# Patient Record
Sex: MALE | Race: WHITE | NOT HISPANIC OR LATINO | Employment: UNEMPLOYED | URBAN - NONMETROPOLITAN AREA
[De-identification: names, ages, dates, MRNs, and addresses within clinical notes are randomized per-mention and may not be internally consistent; named-entity substitution may affect disease eponyms.]

---

## 2022-11-13 ENCOUNTER — HOSPITAL ENCOUNTER (EMERGENCY)
Facility: HOSPITAL | Age: 32
Discharge: HOME | End: 2022-11-13
Attending: EMERGENCY MEDICINE

## 2022-11-13 VITALS
TEMPERATURE: 97.5 F | SYSTOLIC BLOOD PRESSURE: 148 MMHG | WEIGHT: 145 LBS | OXYGEN SATURATION: 100 % | HEART RATE: 117 BPM | DIASTOLIC BLOOD PRESSURE: 88 MMHG | BODY MASS INDEX: 21.98 KG/M2 | HEIGHT: 68 IN | RESPIRATION RATE: 17 BRPM

## 2022-11-13 DIAGNOSIS — T40.601A OPIATE OVERDOSE (HCC): Primary | ICD-10-CM

## 2022-11-13 DIAGNOSIS — F19.10 SUBSTANCE ABUSE (HCC): ICD-10-CM

## 2022-11-13 LAB
AMPHETAMINES SERPL QL SCN: POSITIVE
BARBITURATES UR QL: NEGATIVE
BENZODIAZ UR QL: NEGATIVE
COCAINE UR QL: NEGATIVE
METHADONE UR QL: NEGATIVE
OPIATES UR QL SCN: NEGATIVE
OXYCODONE+OXYMORPHONE UR QL SCN: NEGATIVE
PCP UR QL: NEGATIVE
THC UR QL: NEGATIVE

## 2022-11-13 RX ADMIN — NALOXONE HYDROCHLORIDE 4 MG: 4 SPRAY NASAL at 03:14

## 2022-11-13 NOTE — ED PROVIDER NOTES
History  Chief Complaint   Patient presents with   • Medical Problem     Pt thought he was taking Meth around 2030 but now thinks he accidentally took Fentanyl  Pt stated he received Narcan and CPR at home by bystanders  Pt would like drug testing  29-year-old male with sister requests urine drug testing for evaluation drug ingested earlier  Believes he was snorting methamphetamine approximately 10 30 last evening, became unresponsive, father administered Narcan, sister briefly did CPR/chest compressions  When EMS arrived, was awake, refused further treatment  Notes he is currently comfortable, no chest discomfort or breathing issues  Describes long history of psych and substance use  Notes prior was at [facility] for drug treatment who provided Narcan  History provided by:  Patient and relative  Medical Problem  Quality:  Overdose  Severity:  Severe  Onset quality:  Sudden  Progression:  Resolved  Chronicity:  Recurrent  Context:  Snorted substance believed to be methamphetamine  Relieved by:  Narcan  Associated symptoms: no abdominal pain, no chest pain and no shortness of breath        None       Past Medical History:   Diagnosis Date   • Drug abuse ([facility] )    • Substance abuse ([facility] )        History reviewed  No pertinent surgical history  History reviewed  No pertinent family history  I have reviewed and agree with the history as documented  E-Cigarette/Vaping     E-Cigarette/Vaping Substances     Social History     Tobacco Use   • Smoking status: Current Every Day Smoker     Packs/day: 1 50     Types: Cigarettes   • Smokeless tobacco: Never Used   Substance Use Topics   • Alcohol use: Never   • Drug use: Yes     Types: Methamphetamines       Review of Systems   Respiratory: Negative for shortness of breath  Cardiovascular: Negative for chest pain  Gastrointestinal: Negative for abdominal pain  All other systems reviewed and are negative        Physical Exam  Physical Exam  Vitals and nursing note reviewed  Constitutional:       Comments: Pleasant, comfortable-appearing, no distress, moves easily, appears to have some shoulder/neck and facial tics/spasticity worse talking   HENT:      Head: Normocephalic and atraumatic  Mouth/Throat:      Mouth: Mucous membranes are moist       Pharynx: Oropharynx is clear  Eyes:      Conjunctiva/sclera: Conjunctivae normal       Pupils: Pupils are equal, round, and reactive to light  Cardiovascular:      Rate and Rhythm: Normal rate and regular rhythm  Heart sounds: Normal heart sounds  Comments: No chest tenderness deformity  Pulmonary:      Effort: Pulmonary effort is normal       Breath sounds: Normal breath sounds  Abdominal:      General: Bowel sounds are normal  There is no distension  Palpations: Abdomen is soft  Tenderness: There is no abdominal tenderness  Musculoskeletal:         General: No deformity  Cervical back: Neck supple  Skin:     General: Skin is warm and dry  Neurological:      General: No focal deficit present  Mental Status: He is alert and oriented to person, place, and time  Cranial Nerves: No cranial nerve deficit  Coordination: Coordination normal    Psychiatric:         Judgment: Judgment normal          Vital Signs  ED Triage Vitals [11/13/22 0258]   Temperature Pulse Respirations Blood Pressure SpO2   97 5 °F (36 4 °C) (!) 117 17 148/88 100 %      Temp Source Heart Rate Source Patient Position - Orthostatic VS BP Location FiO2 (%)   Temporal Monitor Sitting Right arm --      Pain Score       --           Vitals:    11/13/22 0258   BP: 148/88   Pulse: (!) 117   Patient Position - Orthostatic VS: Sitting         Visual Acuity      ED Medications  Medications   naloxone nasal- Given to patient by provider at discharge   (NARCAN) 4 mg/0 1 mL nasal spray 4 mg (4 mg Does not apply Given by Other 11/13/22 0314)       Diagnostic Studies  Results Reviewed     Procedure Component Value Units Date/Time    Rapid drug screen, urine [[id number]] Collected: 11/13/22 0314    Lab Status: In process Specimen: Urine, Clean Catch Updated: 11/13/22 0319                 No orders to display              Procedures  Procedures         ED Course  ED Course as of 11/13/22 0332   Sun Nov 13, 2022   0326 Remains comfortable, we discussed high likelihood of overdose related death in the near future without cessation, we provided additional Narcan and discussed clinical outcomes intervention, voices good understanding, will access my chart for results                               SBIRT 22yo+    Flowsheet Row Most Recent Value   SBIRT (23 yo +)    In order to provide better care to our patients, we are screening all of our patients for alcohol and drug use  Would it be okay to ask you these screening questions? No Filed at: 11/13/2022 0300                    MDM    Disposition  Final diagnoses:   Opiate overdose ([facility] )   Substance abuse ([facility] )     Time reflects when diagnosis was documented in both MDM as applicable and the Disposition within this note     Time User Action Codes Description Comment    11/13/2022  3:04 AM Paralee Gil Add [T40 601A] Opiate overdose ([facility] )     11/13/2022  3:04 AM Paralee Gil Add [F19 10] Substance abuse [facility])       ED Disposition     ED Disposition   Discharge    Condition   Stable    Date/Time   Sun Nov 13, 2022  3:04 AM    Comment   Ángel Keenan  discharge to home/self care                 Follow-up Information     Follow up With Specialties Details Why Contact Info Additional [id number] [facility] Family Medicine Schedule an appointment as soon as possible for a visit in 1 week  [phone] [facility], [address] [place] [address] Family Medicine Schedule an appointment as soon as possible for a visit in 1 week [facility]  [phone]             There are no discharge medications for this patient  No discharge procedures on file      PDMP Review     None          ED Provider  Electronically Signed by           Fernie Jenkins DO  11/13/22 0734

## 2023-10-05 ENCOUNTER — OFFICE VISIT (OUTPATIENT)
Dept: URGENT CARE | Facility: CLINIC | Age: 33
End: 2023-10-05
Payer: COMMERCIAL

## 2023-10-05 ENCOUNTER — APPOINTMENT (OUTPATIENT)
Dept: RADIOLOGY | Facility: CLINIC | Age: 33
End: 2023-10-05
Payer: COMMERCIAL

## 2023-10-05 VITALS
TEMPERATURE: 99.6 F | SYSTOLIC BLOOD PRESSURE: 129 MMHG | HEART RATE: 98 BPM | DIASTOLIC BLOOD PRESSURE: 77 MMHG | RESPIRATION RATE: 18 BRPM | OXYGEN SATURATION: 98 %

## 2023-10-05 DIAGNOSIS — L03.116 CELLULITIS OF LEFT ANKLE: ICD-10-CM

## 2023-10-05 DIAGNOSIS — M25.572 ACUTE LEFT ANKLE PAIN: ICD-10-CM

## 2023-10-05 DIAGNOSIS — S90.122A CONTUSION OF FIFTH TOE OF LEFT FOOT, INITIAL ENCOUNTER: ICD-10-CM

## 2023-10-05 DIAGNOSIS — S93.402A SPRAIN OF LEFT ANKLE, UNSPECIFIED LIGAMENT, INITIAL ENCOUNTER: Primary | ICD-10-CM

## 2023-10-05 PROBLEM — F31.9 BIPOLAR DISORDER (HCC): Status: ACTIVE | Noted: 2022-07-03

## 2023-10-05 PROBLEM — Z72.0 TOBACCO USE: Status: ACTIVE | Noted: 2022-07-01

## 2023-10-05 PROCEDURE — 73610 X-RAY EXAM OF ANKLE: CPT

## 2023-10-05 PROCEDURE — 99203 OFFICE O/P NEW LOW 30 MIN: CPT

## 2023-10-05 PROCEDURE — 99203 OFFICE O/P NEW LOW 30 MIN: CPT | Performed by: PHYSICIAN ASSISTANT

## 2023-10-05 PROCEDURE — 73630 X-RAY EXAM OF FOOT: CPT

## 2023-10-05 RX ORDER — DICLOFENAC SODIUM 75 MG/1
75 TABLET, DELAYED RELEASE ORAL 2 TIMES DAILY
Qty: 10 TABLET | Refills: 0 | Status: SHIPPED | OUTPATIENT
Start: 2023-10-05 | End: 2023-10-10

## 2023-10-05 RX ORDER — CEPHALEXIN 500 MG/1
500 CAPSULE ORAL EVERY 6 HOURS SCHEDULED
Qty: 28 CAPSULE | Refills: 0 | Status: SHIPPED | OUTPATIENT
Start: 2023-10-05 | End: 2023-10-12

## 2023-10-05 NOTE — PATIENT INSTRUCTIONS
Ankle Sprain   WHAT YOU NEED TO KNOW:   An ankle sprain happens when 1 or more ligaments in your ankle joint stretch or tear. Ligaments are tough tissues that connect bones. Ligaments support your joints and keep your bones in place. DISCHARGE INSTRUCTIONS:   Return to the emergency department if:   You have severe pain in your ankle. Your foot or toes are cold or numb. Your ankle becomes more weak or unstable (wobbly). You are unable to put any weight on your ankle or foot. Your swelling has increased or returned. Call your doctor if:   Your pain does not go away, even after treatment. You have questions or concerns about your condition or care. Medicines: You may need any of the following:  NSAIDs , such as ibuprofen, help decrease swelling, pain, and fever. This medicine is available with or without a doctor's order. NSAIDs can cause stomach bleeding or kidney problems in certain people. If you take blood thinner medicine, always ask your healthcare provider if NSAIDs are safe for you. Always read the medicine label and follow directions. Acetaminophen  decreases pain and fever. It is available without a doctor's order. Ask how much to take and how often to take it. Follow directions. Read the labels of all other medicines you are using to see if they also contain acetaminophen, or ask your doctor or pharmacist. Acetaminophen can cause liver damage if not taken correctly. Prescription pain medicine  may be given. Ask your healthcare provider how to take this medicine safely. Some prescription pain medicines contain acetaminophen. Do not take other medicines that contain acetaminophen without talking to your healthcare provider. Too much acetaminophen may cause liver damage. Prescription pain medicine may cause constipation. Ask your healthcare provider how to prevent or treat constipation. Take your medicine as directed.   Contact your healthcare provider if you think your medicine is not helping or if you have side effects. Tell your provider if you are allergic to any medicine. Keep a list of the medicines, vitamins, and herbs you take. Include the amounts, and when and why you take them. Bring the list or the pill bottles to follow-up visits. Carry your medicine list with you in case of an emergency. Self-care:   Use support devices , such as a brace, cast, or splint, to limit your movement and protect your joint. You may need to use crutches to decrease your pain as you move around. Go to physical therapy  as directed. A physical therapist teaches you exercises to help improve movement and strength, and to decrease pain. Rest  your ankle so that it can heal. Return to normal activities as directed. Apply ice  on your ankle for 15 to 20 minutes every hour or as directed. Use an ice pack, or put crushed ice in a plastic bag. Cover the ice pack or bag with a towel before you put it on your injury. Ice helps prevent tissue damage and decreases swelling and pain. Compress  your ankle. Ask if you should wrap an elastic bandage around your injured ligament. An elastic bandage provides support and helps decrease swelling and movement so your joint can heal. Wear as long as directed. Elevate  your ankle above the level of your heart as often as you can. This will help decrease swelling and pain. Prop your ankle on pillows or blankets to keep it elevated comfortably. Prevent another ankle sprain:   Let your ankle heal.  Find out how long your ligament needs to heal. Do not do any physical activity until your healthcare provider says it is okay. If you start activity too soon, you may develop a more serious injury. Warm up and stretch before you exercise or play sports. This helps your joints become strong and flexible. Use the right equipment. Always wear shoes that fit well and are made for the activity that you are doing.  You may also need ankle supports, elbow and knee pads, or braces. Follow up with your doctor as directed:  Write down your questions so you remember to ask them during your visits. © Copyright Chris Rios 2023 Information is for End User's use only and may not be sold, redistributed or otherwise used for commercial purposes. The above information is an  only. It is not intended as medical advice for individual conditions or treatments. Talk to your doctor, nurse or pharmacist before following any medical regimen to see if it is safe and effective for you. Anthony Redhead 0  .0

## 2023-10-05 NOTE — PROGRESS NOTES
[facility]        NAME: Williams Julian. is a 34 y.o. male  : 1990    MRN: [id number]  DATE: 2023  TIME: 2:12 PM    Assessment and Plan   Sprain of left ankle, unspecified ligament, initial encounter [S93.402A]  1. Sprain of left ankle, unspecified ligament, initial encounter        2. Acute left ankle pain  XR ankle 3+ vw left    diclofenac (VOLTAREN) 75 mg EC tablet      3. Contusion of fifth toe of left foot, initial encounter  XR foot 3+ vw left      4. Cellulitis of left ankle  cephalexin (KEFLEX) 500 mg capsule            Patient Instructions   Patient Instructions     Ankle Sprain   WHAT YOU NEED TO KNOW:   An ankle sprain happens when 1 or more ligaments in your ankle joint stretch or tear. Ligaments are tough tissues that connect bones. Ligaments support your joints and keep your bones in place. DISCHARGE INSTRUCTIONS:   Return to the emergency department if:   • You have severe pain in your ankle. • Your foot or toes are cold or numb. • Your ankle becomes more weak or unstable (wobbly). • You are unable to put any weight on your ankle or foot. • Your swelling has increased or returned. Call your doctor if:   • Your pain does not go away, even after treatment. • You have questions or concerns about your condition or care. Medicines: You may need any of the following:  • NSAIDs , such as ibuprofen, help decrease swelling, pain, and fever. This medicine is available with or without a doctor's order. NSAIDs can cause stomach bleeding or kidney problems in certain people. If you take blood thinner medicine, always ask your healthcare provider if NSAIDs are safe for you. Always read the medicine label and follow directions. • Acetaminophen  decreases pain and fever. It is available without a doctor's order. Ask how much to take and how often to take it. Follow directions.  Read the labels of all other medicines you are using to see if they also contain acetaminophen, or ask your doctor or pharmacist. Acetaminophen can cause liver damage if not taken correctly. • Prescription pain medicine  may be given. Ask your healthcare provider how to take this medicine safely. Some prescription pain medicines contain acetaminophen. Do not take other medicines that contain acetaminophen without talking to your healthcare provider. Too much acetaminophen may cause liver damage. Prescription pain medicine may cause constipation. Ask your healthcare provider how to prevent or treat constipation. • Take your medicine as directed. Contact your healthcare provider if you think your medicine is not helping or if you have side effects. Tell your provider if you are allergic to any medicine. Keep a list of the medicines, vitamins, and herbs you take. Include the amounts, and when and why you take them. Bring the list or the pill bottles to follow-up visits. Carry your medicine list with you in case of an emergency. Self-care:   • Use support devices , such as a brace, cast, or splint, to limit your movement and protect your joint. You may need to use crutches to decrease your pain as you move around. • Go to physical therapy  as directed. A physical therapist teaches you exercises to help improve movement and strength, and to decrease pain. • Rest  your ankle so that it can heal. Return to normal activities as directed. • Apply ice  on your ankle for 15 to 20 minutes every hour or as directed. Use an ice pack, or put crushed ice in a plastic bag. Cover the ice pack or bag with a towel before you put it on your injury. Ice helps prevent tissue damage and decreases swelling and pain. • Compress  your ankle. Ask if you should wrap an elastic bandage around your injured ligament. An elastic bandage provides support and helps decrease swelling and movement so your joint can heal. Wear as long as directed.          • Elevate  your ankle above the level of your heart as often as you can. This will help decrease swelling and pain. Prop your ankle on pillows or blankets to keep it elevated comfortably. Prevent another ankle sprain:   • Let your ankle heal.  Find out how long your ligament needs to heal. Do not do any physical activity until your healthcare provider says it is okay. If you start activity too soon, you may develop a more serious injury. • Warm up and stretch before you exercise or play sports. This helps your joints become strong and flexible. • Use the right equipment. Always wear shoes that fit well and are made for the activity that you are doing. You may also need ankle supports, elbow and knee pads, or braces. Follow up with your doctor as directed:  Write down your questions so you remember to ask them during your visits. © Copyright [facility] 2023 Information is for End User's use only and may not be sold, redistributed or otherwise used for commercial purposes. The above information is an  only. It is not intended as medical advice for individual conditions or treatments. Talk to your doctor, nurse or pharmacist before following any medical regimen to see if it is safe and effective for you. Keyon Rojo 0  .0        Follow up with PCP in 3-5 days. Proceed to  ER if symptoms worsen. Chief Complaint     Chief Complaint   Patient presents with   • Ankle Injury     Left            History of Present Illness       Patient is a 57-year-old male with past medical history significant for substance abuse disorder who presents to the clinic for an injury to his left lower extremity. He states that he was using a  2 days ago when he slipped off a curb and turned his foot inward. He is complaining of pain and swelling in the left lateral foot. He states that he is having trouble ambulating on his foot. He complains of 10 out of 10 pain. He currently denies any drug use.       Review of Systems   Review of Systems Musculoskeletal: Positive for arthralgias, joint swelling and myalgias. Negative for neck pain and neck stiffness. Skin: Positive for color change and wound. Current Medications       Current Outpatient Medications:   •  cephalexin (KEFLEX) 500 mg capsule, Take 1 capsule (500 mg total) by mouth every 6 (six) hours for 7 days, Disp: 28 capsule, Rfl: 0  •  diclofenac (VOLTAREN) 75 mg EC tablet, Take 1 tablet (75 mg total) by mouth 2 (two) times a day for 5 days, Disp: 10 tablet, Rfl: 0    Current Allergies     Allergies as of 10/05/2023   • (No Known Allergies)            The following portions of the patient's history were reviewed and updated as appropriate: allergies, current medications, past family history, past medical history, past social history, past surgical history and problem list.     Past Medical History:   Diagnosis Date   • Drug abuse ([address])    • Substance abuse ([address])        No past surgical history on file. No family history on file. Medications have been verified. Objective   /77   Pulse 98   Temp 99.6 °F (37.6 °C)   Resp 18   SpO2 98%        Physical Exam     Physical Exam  Musculoskeletal:        Legs:         Feet:       Comments: There is edema, erythema and warmth noted on the left lateral ankle. There is decreased range of motion to flexion, extension, and rotation of the left ankle. There is edema noted in the left foot. Skin:     Comments: Is a small superficial area on the lateral left ankle that could be the source of a developing cellulitis of the left ankle. This is less than 1 cm in size. I personally interpreted the x-ray results and reviewed them with the patient. There is no acute fracture. -He will be placed in a DME cam boot. I will give an antibiotic for possible secondary infection. I suggest close follow-up with his primary care doctor. Patient was instructed to go to the ER if symptoms worsen.   He may need a venous Doppler if symptoms fail to improve.

## 2023-10-08 ENCOUNTER — HOSPITAL ENCOUNTER (EMERGENCY)
Facility: HOSPITAL | Age: 33
Discharge: HOME | End: 2023-10-08
Attending: EMERGENCY MEDICINE
Payer: COMMERCIAL

## 2023-10-08 VITALS
DIASTOLIC BLOOD PRESSURE: 89 MMHG | WEIGHT: 155 LBS | RESPIRATION RATE: 16 BRPM | HEIGHT: 68 IN | SYSTOLIC BLOOD PRESSURE: 137 MMHG | TEMPERATURE: 98.1 F | HEART RATE: 97 BPM | BODY MASS INDEX: 23.49 KG/M2 | OXYGEN SATURATION: 100 %

## 2023-10-08 DIAGNOSIS — L03.90 CELLULITIS: Primary | ICD-10-CM

## 2023-10-08 LAB
ALBUMIN SERPL BCP-MCNC: 3.5 G/DL (ref 3.5–5)
ALP SERPL-CCNC: 51 U/L (ref 34–104)
ALT SERPL W P-5'-P-CCNC: 30 U/L (ref 7–52)
ANION GAP SERPL CALCULATED.3IONS-SCNC: 7 MMOL/L
APTT PPP: 38 SECONDS (ref 23–37)
AST SERPL W P-5'-P-CCNC: 20 U/L (ref 13–39)
BASOPHILS # BLD AUTO: 0.02 THOUSANDS/ÂΜL (ref 0–0.1)
BASOPHILS NFR BLD AUTO: 0 % (ref 0–1)
BILIRUB SERPL-MCNC: 0.33 MG/DL (ref 0.2–1)
BUN SERPL-MCNC: 12 MG/DL (ref 5–25)
CALCIUM SERPL-MCNC: 8.7 MG/DL (ref 8.4–10.2)
CHLORIDE SERPL-SCNC: 103 MMOL/L (ref 96–108)
CO2 SERPL-SCNC: 28 MMOL/L (ref 21–32)
CREAT SERPL-MCNC: 0.73 MG/DL (ref 0.6–1.3)
EOSINOPHIL # BLD AUTO: 0.08 THOUSAND/ÂΜL (ref 0–0.61)
EOSINOPHIL NFR BLD AUTO: 1 % (ref 0–6)
ERYTHROCYTE [DISTWIDTH] IN BLOOD BY AUTOMATED COUNT: 13 % (ref 11.6–15.1)
GFR SERPL CREATININE-BSD FRML MDRD: 123 ML/MIN/1.73SQ M
GLUCOSE SERPL-MCNC: 113 MG/DL (ref 65–140)
HCT VFR BLD AUTO: 31.4 % (ref 36.5–49.3)
HGB BLD-MCNC: 10.4 G/DL (ref 12–17)
IMM GRANULOCYTES # BLD AUTO: 0.03 THOUSAND/UL (ref 0–0.2)
IMM GRANULOCYTES NFR BLD AUTO: 0 % (ref 0–2)
INR PPP: 1.06 (ref 0.84–1.19)
LACTATE SERPL-SCNC: 1.1 MMOL/L (ref 0.5–2)
LYMPHOCYTES # BLD AUTO: 1.09 THOUSANDS/ÂΜL (ref 0.6–4.47)
LYMPHOCYTES NFR BLD AUTO: 14 % (ref 14–44)
MCH RBC QN AUTO: 29.5 PG (ref 26.8–34.3)
MCHC RBC AUTO-ENTMCNC: 33.1 G/DL (ref 31.4–37.4)
MCV RBC AUTO: 89 FL (ref 82–98)
MONOCYTES # BLD AUTO: 1.32 THOUSAND/ÂΜL (ref 0.17–1.22)
MONOCYTES NFR BLD AUTO: 17 % (ref 4–12)
NEUTROPHILS # BLD AUTO: 5.44 THOUSANDS/ÂΜL (ref 1.85–7.62)
NEUTS SEG NFR BLD AUTO: 68 % (ref 43–75)
NRBC BLD AUTO-RTO: 0 /100 WBCS
PLATELET # BLD AUTO: 181 THOUSANDS/UL (ref 149–390)
PMV BLD AUTO: 11.1 FL (ref 8.9–12.7)
POTASSIUM SERPL-SCNC: 3 MMOL/L (ref 3.5–5.3)
PROCALCITONIN SERPL-MCNC: 0.34 NG/ML
PROT SERPL-MCNC: 6.3 G/DL (ref 6.4–8.4)
PROTHROMBIN TIME: 14.1 SECONDS (ref 11.6–14.5)
RBC # BLD AUTO: 3.52 MILLION/UL (ref 3.88–5.62)
SODIUM SERPL-SCNC: 138 MMOL/L (ref 135–147)
WBC # BLD AUTO: 7.98 THOUSAND/UL (ref 4.31–10.16)

## 2023-10-08 PROCEDURE — 85025 COMPLETE CBC W/AUTO DIFF WBC: CPT | Performed by: EMERGENCY MEDICINE

## 2023-10-08 PROCEDURE — 84145 PROCALCITONIN (PCT): CPT | Performed by: EMERGENCY MEDICINE

## 2023-10-08 PROCEDURE — 99284 EMERGENCY DEPT VISIT MOD MDM: CPT | Performed by: EMERGENCY MEDICINE

## 2023-10-08 PROCEDURE — 80053 COMPREHEN METABOLIC PANEL: CPT | Performed by: EMERGENCY MEDICINE

## 2023-10-08 PROCEDURE — 83605 ASSAY OF LACTIC ACID: CPT | Performed by: EMERGENCY MEDICINE

## 2023-10-08 PROCEDURE — 36415 COLL VENOUS BLD VENIPUNCTURE: CPT | Performed by: EMERGENCY MEDICINE

## 2023-10-08 PROCEDURE — 85610 PROTHROMBIN TIME: CPT | Performed by: EMERGENCY MEDICINE

## 2023-10-08 PROCEDURE — 99283 EMERGENCY DEPT VISIT LOW MDM: CPT

## 2023-10-08 PROCEDURE — 85730 THROMBOPLASTIN TIME PARTIAL: CPT | Performed by: EMERGENCY MEDICINE

## 2023-10-08 RX ORDER — POTASSIUM CHLORIDE 20 MEQ/1
40 TABLET, EXTENDED RELEASE ORAL ONCE
Status: COMPLETED | OUTPATIENT
Start: 2023-10-08 | End: 2023-10-08

## 2023-10-08 RX ADMIN — POTASSIUM CHLORIDE 40 MEQ: 1500 TABLET, EXTENDED RELEASE ORAL at 21:26

## 2023-10-09 NOTE — ED PROVIDER NOTES
History  Chief Complaint   Patient presents with   • Ankle Swelling     Pt reports an increase in left ankle swelling/redness/pain over the last month and a half. Pt is unsure how he originally hurt his ankle but had an xray taken a few days ago which showed a foreign body in the ankle     34year old male with sister concerned about foreign body seen on xray of left ankle for questionable injury this past week describes subacute swelling left leg over the past week, questions injuring while powerwashing. Currently on antibiotics and notes improvement. No fever or chills. No chest pain or dyspnea. No history of VTE. History provided by:  Patient  Medical Problem  Location:  Lower extremity  Quality:  Comparison swelling  Onset quality:  Gradual  Timing:  Constant  Progression:  Worsening  Chronicity:  New  Context:  Atraumatic  Relieved by: Antibiotics  Worsened by:  Nothing  Ineffective treatments:  None  Associated symptoms: no chest pain, no fever and no shortness of breath        Prior to Admission Medications   Prescriptions Last Dose Informant Patient Reported? Taking? cephalexin (KEFLEX) 500 mg capsule   No No   Sig: Take 1 capsule (500 mg total) by mouth every 6 (six) hours for 7 days   diclofenac (VOLTAREN) 75 mg EC tablet   No No   Sig: Take 1 tablet (75 mg total) by mouth 2 (two) times a day for 5 days      Facility-Administered Medications: None       Past Medical History:   Diagnosis Date   • Drug abuse ([facility])    • Substance abuse ([address] [facility])        History reviewed. No pertinent surgical history. History reviewed. No pertinent family history. I have reviewed and agree with the history as documented.     E-Cigarette/Vaping     E-Cigarette/Vaping Substances     Social History     Tobacco Use   • Smoking status: Every Day     Packs/day: 1.50     Types: Cigarettes   • Smokeless tobacco: Never   Substance Use Topics   • Alcohol use: Never   • Drug use: Not Currently     Types: Methamphetamines Review of Systems   Constitutional: Negative for fever. Respiratory: Negative for shortness of breath. Cardiovascular: Negative for chest pain. All other systems reviewed and are negative. Physical Exam  Physical Exam  Vitals and nursing note reviewed. Constitutional:       Comments: Pleasant, comfortable-appearing   HENT:      Head: Normocephalic and atraumatic. Mouth/Throat:      Mouth: Mucous membranes are moist.      Pharynx: Oropharynx is clear. Comments: Edentulous  Eyes:      Conjunctiva/sclera: Conjunctivae normal.      Pupils: Pupils are equal, round, and reactive to light. Cardiovascular:      Rate and Rhythm: Normal rate and regular rhythm. Heart sounds: Normal heart sounds. Pulmonary:      Effort: Pulmonary effort is normal.      Breath sounds: Normal breath sounds. Abdominal:      General: Bowel sounds are normal. There is no distension. Palpations: Abdomen is soft. Tenderness: There is no abdominal tenderness. Musculoskeletal:         General: No deformity. Cervical back: Neck supple. Right lower leg: Edema present. Left lower leg: Edema present. Comments: Right pedal edema, left mid distal leg swelling, erythema, worse at foot and ankle, minimally tender, area of retained foreign body at mid distal anterior leg generally nontender or erythematous locally   Skin:     General: Skin is warm and dry. Neurological:      General: No focal deficit present. Mental Status: He is alert and oriented to person, place, and time. Cranial Nerves: No cranial nerve deficit. Coordination: Coordination normal.   Psychiatric:         Behavior: Behavior normal.         Thought Content:  Thought content normal.         Judgment: Judgment normal.         Vital Signs  ED Triage Vitals [10/08/23 1909]   Temperature Pulse Respirations Blood Pressure SpO2   98.1 °F (36.7 °C) 97 16 137/89 100 %      Temp Source Heart Rate Source Patient Position - Orthostatic VS BP Location FiO2 (%)   Temporal Monitor Sitting -- --      Pain Score       --           Vitals:    10/08/23 1909   BP: 137/89   Pulse: 97   Patient Position - Orthostatic VS: Sitting         Visual Acuity      ED Medications  Medications   potassium chloride (K-DUR,KLOR-CON) CR tablet 40 mEq (40 mEq Oral Given 10/8/23 2126)       Diagnostic Studies  Results Reviewed     Procedure Component Value Units Date/Time    Procalcitonin [[id number]]  (Abnormal) Collected: 10/08/23 2043    Lab Status: Final result Specimen: Blood from Arm, Left Updated: 10/08/23 2117     Procalcitonin 0.34 ng/ml     Comprehensive metabolic panel [[id number]]  (Abnormal) Collected: 10/08/23 2043    Lab Status: Final result Specimen: Blood from Arm, Left Updated: 10/08/23 2107     Sodium 138 mmol/L      Potassium 3.0 mmol/L      Chloride 103 mmol/L      CO2 28 mmol/L      ANION GAP 7 mmol/L      BUN 12 mg/dL      Creatinine 0.73 mg/dL      Glucose 113 mg/dL      Calcium 8.7 mg/dL      AST 20 U/L      ALT 30 U/L      Alkaline Phosphatase 51 U/L      Total Protein 6.3 g/dL      Albumin 3.5 g/dL      Total Bilirubin 0.33 mg/dL      eGFR 123 ml/min/1.73sq m     Narrative:      [place] guidelines for Chronic Kidney Disease (CKD):   •  Stage 1 with normal or high GFR (GFR > 90 mL/min/1.73 square meters)  •  Stage 2 Mild CKD (GFR = 60-89 mL/min/1.73 square meters)  •  Stage 3A Moderate CKD (GFR = 45-59 mL/min/1.73 square meters)  •  Stage 3B Moderate CKD (GFR = 30-44 mL/min/1.73 square meters)  •  Stage 4 Severe CKD (GFR = 15-29 mL/min/1.73 square meters)  •  Stage 5 End Stage CKD (GFR <15 mL/min/1.73 square meters)  Note: GFR calculation is accurate only with a steady state creatinine    Lactic acid, plasma (w/reflex if result > 2.0) [[id number]]  (Normal) Collected: 10/08/23 2043    Lab Status: Final result Specimen: Blood from Arm, Left Updated: 10/08/23 2106     LACTIC ACID 1.1 mmol/L Narrative:      Result may be elevated if tourniquet was used during collection. Protime-INR [[id number]]  (Normal) Collected: 10/08/23 2043    Lab Status: Final result Specimen: Blood from Arm, Left Updated: 10/08/23 2104     Protime 14.1 seconds      INR 1.06    APTT [[id number]]  (Abnormal) Collected: 10/08/23 2043    Lab Status: Final result Specimen: Blood from Arm, Left Updated: 10/08/23 2104     PTT 38 seconds     CBC and differential [[id number]]  (Abnormal) Collected: 10/08/23 2043    Lab Status: Final result Specimen: Blood from Arm, Left Updated: 10/08/23 2049     WBC 7.98 Thousand/uL      RBC 3.52 Million/uL      Hemoglobin 10.4 g/dL      Hematocrit 31.4 %      MCV 89 fL      MCH 29.5 pg      MCHC 33.1 g/dL      RDW 13.0 %      MPV 11.1 fL      Platelets 447 Thousands/uL      nRBC 0 /100 WBCs      Neutrophils Relative 68 %      Immat GRANS % 0 %      Lymphocytes Relative 14 %      Monocytes Relative 17 %      Eosinophils Relative 1 %      Basophils Relative 0 %      Neutrophils Absolute 5.44 Thousands/µL      Immature Grans Absolute 0.03 Thousand/uL      Lymphocytes Absolute 1.09 Thousands/µL      Monocytes Absolute 1.32 Thousand/µL      Eosinophils Absolute 0.08 Thousand/µL      Basophils Absolute 0.02 Thousands/µL                  VAS lower limb venous duplex study, unilateral/limited    (Results Pending)              Procedures  Procedures         ED Course  ED Course as of 10/08/23 2151   [facility] Oct 08, 2023   2113 Potassium(!): 3.0   2113 LACTIC ACID: 1.1   2135 Procalcitonin(!): 0.34   2145 Sister notes noncompliant with bipolar medications and continues with substance use disorder.   We discussed results, inflammatory markers, clinical improvement on oral antibiotics and agreeable with close outpatient follow-up, will return immediately if worse or any new symptoms and for vascular study tomorrow                                             Medical Decision Making  Amount and/or Complexity of Data Reviewed  Labs: ordered. Decision-making details documented in ED Course. Radiology:  Decision-making details documented in ED Course. ECG/medicine tests: ordered and independent interpretation performed. Decision-making details documented in ED Course. Risk  Prescription drug management. Disposition  Final diagnoses:   Cellulitis     Time reflects when diagnosis was documented in both MDM as applicable and the Disposition within this note     Time User Action Codes Description Comment    10/8/2023  9:47 PM Ndaeen Calixto Add [L03.90] Cellulitis     10/8/2023  9:51 PM Nadeen Calixto Add [F31.9] Bipolar disorder ([address])     10/8/2023  9:51 PM Nadeen Calixto Remove [F31.9] Bipolar disorder [facility])       ED Disposition     ED Disposition   Discharge    Condition   Stable    Date/Time   Sun Oct 8, 2023  9:47 PM    Comment   Rocco Kincaid. discharge to home/self care. Follow-up Information    None         Patient's Medications   Discharge Prescriptions    No medications on file       Outpatient Discharge Orders   Ambulatory referral to Family Practice   Standing Status: Future Standing Exp. Date: 10/08/24      VAS lower limb venous duplex study, unilateral/limited   Standing Status: Future Standing Exp.  Date: 10/08/27       PDMP Review     None          ED Provider  Electronically Signed by           Nadeen Calixto DO  10/08/23 2153

## 2023-10-09 NOTE — DISCHARGE INSTRUCTIONS
Unlikely retained foreign body related to leg redness and swelling, probable infection/cellulitis, schedule vascular study tomorrow    Please call [facility] as soon as possible to arrange [phone] toll free    Monday to Friday: 7 am to 7 pm  Saturday: 8 am to 12 pm    [facility] Vascular Department [phone]    Return immediately if worse or any new symptoms  Tylenol 1000 mg every 6 hours as needed  and/or  Advil 400 mg every 6 hours as needed  May take both together

## 2023-10-16 ENCOUNTER — HOSPITAL ENCOUNTER (OUTPATIENT)
Dept: NON INVASIVE DIAGNOSTICS | Facility: HOSPITAL | Age: 33
Discharge: HOME | End: 2023-10-16
Attending: EMERGENCY MEDICINE
Payer: COMMERCIAL

## 2023-10-16 DIAGNOSIS — L03.90 CELLULITIS: ICD-10-CM

## 2023-10-16 PROCEDURE — 93971 EXTREMITY STUDY: CPT

## 2023-10-16 PROCEDURE — 93971 EXTREMITY STUDY: CPT | Performed by: SURGERY

## 2025-07-15 ENCOUNTER — APPOINTMENT (EMERGENCY)
Dept: CT IMAGING | Facility: HOSPITAL | Age: 35
DRG: 383 | End: 2025-07-15
Payer: COMMERCIAL

## 2025-07-15 ENCOUNTER — HOSPITAL ENCOUNTER (INPATIENT)
Facility: HOSPITAL | Age: 35
LOS: 2 days | Discharge: HOME | DRG: 383 | End: 2025-07-17
Attending: EMERGENCY MEDICINE | Admitting: STUDENT IN AN ORGANIZED HEALTH CARE EDUCATION/TRAINING PROGRAM
Payer: COMMERCIAL

## 2025-07-15 ENCOUNTER — APPOINTMENT (EMERGENCY)
Dept: RADIOLOGY | Facility: HOSPITAL | Age: 35
DRG: 383 | End: 2025-07-15
Payer: COMMERCIAL

## 2025-07-15 DIAGNOSIS — F11.90 OPIATE USE: ICD-10-CM

## 2025-07-15 DIAGNOSIS — L03.90 WOUND CELLULITIS: ICD-10-CM

## 2025-07-15 DIAGNOSIS — F19.10 DRUG ABUSE (HCC): ICD-10-CM

## 2025-07-15 DIAGNOSIS — Z51.89 VISIT FOR WOUND CHECK: Primary | ICD-10-CM

## 2025-07-15 DIAGNOSIS — F11.20 OPIOID USE DISORDER, SEVERE, DEPENDENCE (HCC): ICD-10-CM

## 2025-07-15 DIAGNOSIS — F17.200 CURRENT SMOKER: ICD-10-CM

## 2025-07-15 DIAGNOSIS — S91.002A ANKLE WOUND, LEFT, INITIAL ENCOUNTER: ICD-10-CM

## 2025-07-15 DIAGNOSIS — L03.90 CELLULITIS, UNSPECIFIED CELLULITIS SITE: ICD-10-CM

## 2025-07-15 DIAGNOSIS — F11.93 OPIOID WITHDRAWAL (HCC): ICD-10-CM

## 2025-07-15 LAB
ALBUMIN SERPL BCG-MCNC: 4.7 G/DL (ref 3.5–5)
ALP SERPL-CCNC: 52 U/L (ref 34–104)
ALT SERPL W P-5'-P-CCNC: 12 U/L (ref 7–52)
ANION GAP SERPL CALCULATED.3IONS-SCNC: 7 MMOL/L (ref 4–13)
APTT PPP: 27 SECONDS (ref 23–34)
AST SERPL W P-5'-P-CCNC: 11 U/L (ref 13–39)
BACTERIA UR QL AUTO: NORMAL /HPF
BASOPHILS # BLD AUTO: 0.03 THOUSANDS/ÂΜL (ref 0–0.1)
BASOPHILS NFR BLD AUTO: 1 % (ref 0–1)
BILIRUB SERPL-MCNC: 0.34 MG/DL (ref 0.2–1)
BILIRUB UR QL STRIP: NEGATIVE
BNP SERPL-MCNC: 5 PG/ML (ref 0–100)
BUN SERPL-MCNC: 12 MG/DL (ref 5–25)
CALCIUM SERPL-MCNC: 9.4 MG/DL (ref 8.4–10.2)
CHLORIDE SERPL-SCNC: 106 MMOL/L (ref 96–108)
CHOLEST SERPL-MCNC: 129 MG/DL (ref ?–200)
CLARITY UR: CLEAR
CO2 SERPL-SCNC: 27 MMOL/L (ref 21–32)
COLOR UR: ABNORMAL
CREAT SERPL-MCNC: 1.03 MG/DL (ref 0.6–1.3)
EOSINOPHIL # BLD AUTO: 0.02 THOUSAND/ÂΜL (ref 0–0.61)
EOSINOPHIL NFR BLD AUTO: 0 % (ref 0–6)
ERYTHROCYTE [DISTWIDTH] IN BLOOD BY AUTOMATED COUNT: 13 % (ref 11.6–15.1)
GFR SERPL CREATININE-BSD FRML MDRD: 94 ML/MIN/1.73SQ M
GLUCOSE SERPL-MCNC: 95 MG/DL (ref 65–140)
GLUCOSE UR STRIP-MCNC: NEGATIVE MG/DL
HCT VFR BLD AUTO: 41.1 % (ref 36.5–49.3)
HDLC SERPL-MCNC: 57 MG/DL
HGB BLD-MCNC: 13.3 G/DL (ref 12–17)
HGB UR QL STRIP.AUTO: NEGATIVE
IMM GRANULOCYTES # BLD AUTO: 0.01 THOUSAND/UL (ref 0–0.2)
IMM GRANULOCYTES NFR BLD AUTO: 0 % (ref 0–2)
INR PPP: 1.05 (ref 0.85–1.19)
KETONES UR STRIP-MCNC: NEGATIVE MG/DL
LACTATE SERPL-SCNC: 0.9 MMOL/L (ref 0.5–2)
LDLC SERPL CALC-MCNC: 62 MG/DL (ref 0–100)
LEUKOCYTE ESTERASE UR QL STRIP: NEGATIVE
LIPASE SERPL-CCNC: 116 U/L (ref 11–82)
LYMPHOCYTES # BLD AUTO: 0.83 THOUSANDS/ÂΜL (ref 0.6–4.47)
LYMPHOCYTES NFR BLD AUTO: 17 % (ref 14–44)
MAGNESIUM SERPL-MCNC: 2.2 MG/DL (ref 1.9–2.7)
MCH RBC QN AUTO: 29.2 PG (ref 26.8–34.3)
MCHC RBC AUTO-ENTMCNC: 32.4 G/DL (ref 31.4–37.4)
MCV RBC AUTO: 90 FL (ref 82–98)
MONOCYTES # BLD AUTO: 0.6 THOUSAND/ÂΜL (ref 0.17–1.22)
MONOCYTES NFR BLD AUTO: 12 % (ref 4–12)
NEUTROPHILS # BLD AUTO: 3.39 THOUSANDS/ÂΜL (ref 1.85–7.62)
NEUTS SEG NFR BLD AUTO: 70 % (ref 43–75)
NITRITE UR QL STRIP: NEGATIVE
NON-SQ EPI CELLS URNS QL MICRO: NORMAL /HPF
NRBC BLD AUTO-RTO: 0 /100 WBCS
PH UR STRIP.AUTO: 6 [PH]
PHOSPHATE SERPL-MCNC: 4.2 MG/DL (ref 2.7–4.5)
PLATELET # BLD AUTO: 225 THOUSANDS/UL (ref 149–390)
PMV BLD AUTO: 11.1 FL (ref 8.9–12.7)
POTASSIUM SERPL-SCNC: 4 MMOL/L (ref 3.5–5.3)
PROCALCITONIN SERPL-MCNC: <0.05 NG/ML
PROT SERPL-MCNC: 7.5 G/DL (ref 6.4–8.4)
PROT UR STRIP-MCNC: ABNORMAL MG/DL
PROTHROMBIN TIME: 14.3 SECONDS (ref 12.3–15)
RBC # BLD AUTO: 4.55 MILLION/UL (ref 3.88–5.62)
RBC #/AREA URNS AUTO: NORMAL /HPF
SODIUM SERPL-SCNC: 140 MMOL/L (ref 135–147)
SP GR UR STRIP.AUTO: >=1.03 (ref 1–1.03)
TRIGL SERPL-MCNC: 50 MG/DL (ref ?–150)
TSH SERPL DL<=0.05 MIU/L-ACNC: 0.63 UIU/ML (ref 0.45–4.5)
UROBILINOGEN UR QL STRIP.AUTO: 0.2 E.U./DL
WBC # BLD AUTO: 4.88 THOUSAND/UL (ref 4.31–10.16)
WBC #/AREA URNS AUTO: NORMAL /HPF

## 2025-07-15 PROCEDURE — 80354 DRUG SCREENING FENTANYL: CPT

## 2025-07-15 PROCEDURE — 85025 COMPLETE CBC W/AUTO DIFF WBC: CPT | Performed by: EMERGENCY MEDICINE

## 2025-07-15 PROCEDURE — 73600 X-RAY EXAM OF ANKLE: CPT

## 2025-07-15 PROCEDURE — 99285 EMERGENCY DEPT VISIT HI MDM: CPT | Performed by: EMERGENCY MEDICINE

## 2025-07-15 PROCEDURE — 87040 BLOOD CULTURE FOR BACTERIA: CPT | Performed by: EMERGENCY MEDICINE

## 2025-07-15 PROCEDURE — 80307 DRUG TEST PRSMV CHEM ANLYZR: CPT

## 2025-07-15 PROCEDURE — 96365 THER/PROPH/DIAG IV INF INIT: CPT

## 2025-07-15 PROCEDURE — 80324 DRUG SCREEN AMPHETAMINES 1/2: CPT

## 2025-07-15 PROCEDURE — 84145 PROCALCITONIN (PCT): CPT | Performed by: EMERGENCY MEDICINE

## 2025-07-15 PROCEDURE — 87186 SC STD MICRODIL/AGAR DIL: CPT

## 2025-07-15 PROCEDURE — 80348 DRUG SCREENING BUPRENORPHINE: CPT

## 2025-07-15 PROCEDURE — 87070 CULTURE OTHR SPECIMN AEROBIC: CPT

## 2025-07-15 PROCEDURE — 80362 OPIOIDS & OPIATE ANALOGS 1/2: CPT

## 2025-07-15 PROCEDURE — 83735 ASSAY OF MAGNESIUM: CPT | Performed by: EMERGENCY MEDICINE

## 2025-07-15 PROCEDURE — 73700 CT LOWER EXTREMITY W/O DYE: CPT

## 2025-07-15 PROCEDURE — 80053 COMPREHEN METABOLIC PANEL: CPT | Performed by: EMERGENCY MEDICINE

## 2025-07-15 PROCEDURE — 80359 METHYLENEDIOXYAMPHETAMINES: CPT

## 2025-07-15 PROCEDURE — 84100 ASSAY OF PHOSPHORUS: CPT

## 2025-07-15 PROCEDURE — 81001 URINALYSIS AUTO W/SCOPE: CPT

## 2025-07-15 PROCEDURE — 87077 CULTURE AEROBIC IDENTIFY: CPT

## 2025-07-15 PROCEDURE — 84443 ASSAY THYROID STIM HORMONE: CPT

## 2025-07-15 PROCEDURE — 85610 PROTHROMBIN TIME: CPT | Performed by: EMERGENCY MEDICINE

## 2025-07-15 PROCEDURE — 83690 ASSAY OF LIPASE: CPT | Performed by: EMERGENCY MEDICINE

## 2025-07-15 PROCEDURE — 99284 EMERGENCY DEPT VISIT MOD MDM: CPT

## 2025-07-15 PROCEDURE — 80061 LIPID PANEL: CPT

## 2025-07-15 PROCEDURE — 36415 COLL VENOUS BLD VENIPUNCTURE: CPT | Performed by: EMERGENCY MEDICINE

## 2025-07-15 PROCEDURE — 87205 SMEAR GRAM STAIN: CPT

## 2025-07-15 PROCEDURE — 83036 HEMOGLOBIN GLYCOSYLATED A1C: CPT

## 2025-07-15 PROCEDURE — 96367 TX/PROPH/DG ADDL SEQ IV INF: CPT

## 2025-07-15 PROCEDURE — 80349 CANNABINOIDS NATURAL: CPT

## 2025-07-15 PROCEDURE — 83605 ASSAY OF LACTIC ACID: CPT | Performed by: EMERGENCY MEDICINE

## 2025-07-15 PROCEDURE — 85730 THROMBOPLASTIN TIME PARTIAL: CPT | Performed by: EMERGENCY MEDICINE

## 2025-07-15 PROCEDURE — 83880 ASSAY OF NATRIURETIC PEPTIDE: CPT | Performed by: EMERGENCY MEDICINE

## 2025-07-15 RX ORDER — VANCOMYCIN HYDROCHLORIDE 1 G/200ML
15 INJECTION, SOLUTION INTRAVENOUS EVERY 12 HOURS
Status: DISCONTINUED | OUTPATIENT
Start: 2025-07-16 | End: 2025-07-16

## 2025-07-15 RX ORDER — NICOTINE 21 MG/24HR
1 PATCH, TRANSDERMAL 24 HOURS TRANSDERMAL DAILY
Status: DISCONTINUED | OUTPATIENT
Start: 2025-07-15 | End: 2025-07-17 | Stop reason: HOSPADM

## 2025-07-15 RX ORDER — VANCOMYCIN HYDROCHLORIDE 1 G/200ML
15 INJECTION, SOLUTION INTRAVENOUS ONCE
Status: COMPLETED | OUTPATIENT
Start: 2025-07-15 | End: 2025-07-15

## 2025-07-15 RX ORDER — ACETAMINOPHEN 325 MG/1
650 TABLET ORAL EVERY 6 HOURS PRN
Status: DISCONTINUED | OUTPATIENT
Start: 2025-07-15 | End: 2025-07-17 | Stop reason: HOSPADM

## 2025-07-15 RX ORDER — HEPARIN SODIUM 5000 [USP'U]/ML
5000 INJECTION, SOLUTION INTRAVENOUS; SUBCUTANEOUS EVERY 8 HOURS SCHEDULED
Status: DISCONTINUED | OUTPATIENT
Start: 2025-07-15 | End: 2025-07-17 | Stop reason: HOSPADM

## 2025-07-15 RX ORDER — CALCIUM CARBONATE 500 MG/1
1000 TABLET, CHEWABLE ORAL DAILY PRN
Status: DISCONTINUED | OUTPATIENT
Start: 2025-07-15 | End: 2025-07-17 | Stop reason: HOSPADM

## 2025-07-15 RX ADMIN — HEPARIN SODIUM 5000 UNITS: 5000 INJECTION INTRAVENOUS; SUBCUTANEOUS at 23:27

## 2025-07-15 RX ADMIN — VANCOMYCIN HYDROCHLORIDE 1000 MG: 1 INJECTION, SOLUTION INTRAVENOUS at 20:56

## 2025-07-15 RX ADMIN — ACETAMINOPHEN 650 MG: 325 TABLET ORAL at 23:24

## 2025-07-15 RX ADMIN — NICOTINE 1 PATCH: 21 PATCH, EXTENDED RELEASE TRANSDERMAL at 23:25

## 2025-07-15 RX ADMIN — PIPERACILLIN AND TAZOBACTAM 4.5 G: 36; 4.5 INJECTION, POWDER, FOR SOLUTION INTRAVENOUS at 20:26

## 2025-07-16 ENCOUNTER — APPOINTMENT (INPATIENT)
Dept: NON INVASIVE DIAGNOSTICS | Facility: HOSPITAL | Age: 35
DRG: 383 | End: 2025-07-16
Payer: COMMERCIAL

## 2025-07-16 ENCOUNTER — APPOINTMENT (INPATIENT)
Dept: MRI IMAGING | Facility: HOSPITAL | Age: 35
DRG: 383 | End: 2025-07-16
Payer: COMMERCIAL

## 2025-07-16 PROBLEM — F11.20 OPIOID USE DISORDER, SEVERE, DEPENDENCE (HCC): Status: ACTIVE | Noted: 2025-07-16

## 2025-07-16 PROBLEM — M86.162: Status: ACTIVE | Noted: 2025-07-15

## 2025-07-16 PROBLEM — F19.10 DRUG ABUSE (HCC): Status: ACTIVE | Noted: 2025-07-16

## 2025-07-16 PROBLEM — L03.116 CELLULITIS OF LEFT LEG: Status: ACTIVE | Noted: 2025-07-15

## 2025-07-16 PROBLEM — S91.002S ANKLE WOUND, LEFT, SEQUELA: Status: ACTIVE | Noted: 2025-07-16

## 2025-07-16 PROBLEM — F11.93 OPIOID WITHDRAWAL (HCC): Status: ACTIVE | Noted: 2025-07-16

## 2025-07-16 PROBLEM — F17.200 CURRENT SMOKER: Status: ACTIVE | Noted: 2025-07-16

## 2025-07-16 PROBLEM — F11.90 OPIATE USE: Status: ACTIVE | Noted: 2025-07-16

## 2025-07-16 LAB
ALBUMIN SERPL BCG-MCNC: 4.5 G/DL (ref 3.5–5)
ALP SERPL-CCNC: 53 U/L (ref 34–104)
ALT SERPL W P-5'-P-CCNC: 11 U/L (ref 7–52)
AMPHETAMINES SERPL QL SCN: POSITIVE
ANION GAP SERPL CALCULATED.3IONS-SCNC: 9 MMOL/L (ref 4–13)
AST SERPL W P-5'-P-CCNC: 11 U/L (ref 13–39)
ATRIAL RATE: 62 BPM
BARBITURATES UR QL: NEGATIVE
BENZODIAZ UR QL: NEGATIVE
BILIRUB SERPL-MCNC: 0.77 MG/DL (ref 0.2–1)
BUN SERPL-MCNC: 12 MG/DL (ref 5–25)
CALCIUM SERPL-MCNC: 9.4 MG/DL (ref 8.4–10.2)
CHLORIDE SERPL-SCNC: 105 MMOL/L (ref 96–108)
CO2 SERPL-SCNC: 27 MMOL/L (ref 21–32)
COCAINE UR QL: NEGATIVE
CREAT SERPL-MCNC: 0.84 MG/DL (ref 0.6–1.3)
ERYTHROCYTE [DISTWIDTH] IN BLOOD BY AUTOMATED COUNT: 13 % (ref 11.6–15.1)
EST. AVERAGE GLUCOSE BLD GHB EST-MCNC: 123 MG/DL
FENTANYL UR QL SCN: POSITIVE
GFR SERPL CREATININE-BSD FRML MDRD: 115 ML/MIN/1.73SQ M
GLUCOSE SERPL-MCNC: 103 MG/DL (ref 65–140)
HBA1C MFR BLD: 5.9 %
HCT VFR BLD AUTO: 42.4 % (ref 36.5–49.3)
HGB BLD-MCNC: 14.1 G/DL (ref 12–17)
HYDROCODONE UR QL SCN: NEGATIVE
MCH RBC QN AUTO: 29.6 PG (ref 26.8–34.3)
MCHC RBC AUTO-ENTMCNC: 33.3 G/DL (ref 31.4–37.4)
MCV RBC AUTO: 89 FL (ref 82–98)
METHADONE UR QL: NEGATIVE
OPIATES UR QL SCN: NEGATIVE
OXYCODONE+OXYMORPHONE UR QL SCN: NEGATIVE
P AXIS: 72 DEGREES
PCP UR QL: NEGATIVE
PLATELET # BLD AUTO: 201 THOUSANDS/UL (ref 149–390)
PMV BLD AUTO: 10.7 FL (ref 8.9–12.7)
POTASSIUM SERPL-SCNC: 3.7 MMOL/L (ref 3.5–5.3)
PR INTERVAL: 168 MS
PROT SERPL-MCNC: 7.2 G/DL (ref 6.4–8.4)
QRS AXIS: 64 DEGREES
QRSD INTERVAL: 98 MS
QT INTERVAL: 428 MS
QTC INTERVAL: 434 MS
RBC # BLD AUTO: 4.77 MILLION/UL (ref 3.88–5.62)
SODIUM SERPL-SCNC: 141 MMOL/L (ref 135–147)
T WAVE AXIS: 59 DEGREES
THC UR QL: POSITIVE
VENTRICULAR RATE: 62 BPM
WBC # BLD AUTO: 4.6 THOUSAND/UL (ref 4.31–10.16)

## 2025-07-16 PROCEDURE — 99222 1ST HOSP IP/OBS MODERATE 55: CPT | Performed by: STUDENT IN AN ORGANIZED HEALTH CARE EDUCATION/TRAINING PROGRAM

## 2025-07-16 PROCEDURE — 85027 COMPLETE CBC AUTOMATED: CPT

## 2025-07-16 PROCEDURE — 73721 MRI JNT OF LWR EXTRE W/O DYE: CPT

## 2025-07-16 PROCEDURE — 99254 IP/OBS CNSLTJ NEW/EST MOD 60: CPT | Performed by: STUDENT IN AN ORGANIZED HEALTH CARE EDUCATION/TRAINING PROGRAM

## 2025-07-16 PROCEDURE — 93005 ELECTROCARDIOGRAM TRACING: CPT

## 2025-07-16 PROCEDURE — 87081 CULTURE SCREEN ONLY: CPT

## 2025-07-16 PROCEDURE — G0427 INPT/ED TELECONSULT70: HCPCS | Performed by: INTERNAL MEDICINE

## 2025-07-16 PROCEDURE — 93923 UPR/LXTR ART STDY 3+ LVLS: CPT

## 2025-07-16 PROCEDURE — 11042 DBRDMT SUBQ TIS 1ST 20SQCM/<: CPT | Performed by: STUDENT IN AN ORGANIZED HEALTH CARE EDUCATION/TRAINING PROGRAM

## 2025-07-16 PROCEDURE — 80307 DRUG TEST PRSMV CHEM ANLYZR: CPT

## 2025-07-16 PROCEDURE — 93923 UPR/LXTR ART STDY 3+ LVLS: CPT | Performed by: SURGERY

## 2025-07-16 PROCEDURE — 80053 COMPREHEN METABOLIC PANEL: CPT

## 2025-07-16 PROCEDURE — 93010 ELECTROCARDIOGRAM REPORT: CPT | Performed by: INTERNAL MEDICINE

## 2025-07-16 RX ORDER — TRAMADOL HYDROCHLORIDE 50 MG/1
50 TABLET ORAL ONCE
Status: COMPLETED | OUTPATIENT
Start: 2025-07-16 | End: 2025-07-16

## 2025-07-16 RX ORDER — LOPERAMIDE HYDROCHLORIDE 2 MG/1
4 CAPSULE ORAL ONCE
Status: COMPLETED | OUTPATIENT
Start: 2025-07-16 | End: 2025-07-16

## 2025-07-16 RX ORDER — CLONIDINE HYDROCHLORIDE 0.1 MG/1
0.1 TABLET ORAL EVERY 8 HOURS SCHEDULED
Status: DISCONTINUED | OUTPATIENT
Start: 2025-07-16 | End: 2025-07-17 | Stop reason: HOSPADM

## 2025-07-16 RX ORDER — GABAPENTIN 300 MG/1
300 CAPSULE ORAL 3 TIMES DAILY
Status: DISCONTINUED | OUTPATIENT
Start: 2025-07-16 | End: 2025-07-17 | Stop reason: HOSPADM

## 2025-07-16 RX ORDER — BUPRENORPHINE 10 UG/H
20 PATCH TRANSDERMAL ONCE
Status: DISCONTINUED | OUTPATIENT
Start: 2025-07-16 | End: 2025-07-17 | Stop reason: HOSPADM

## 2025-07-16 RX ORDER — TRAMADOL HYDROCHLORIDE 50 MG/1
50 TABLET ORAL EVERY 6 HOURS PRN
Status: DISCONTINUED | OUTPATIENT
Start: 2025-07-16 | End: 2025-07-16

## 2025-07-16 RX ORDER — CYCLOBENZAPRINE HCL 10 MG
10 TABLET ORAL 3 TIMES DAILY
Status: DISCONTINUED | OUTPATIENT
Start: 2025-07-16 | End: 2025-07-17 | Stop reason: HOSPADM

## 2025-07-16 RX ORDER — LOPERAMIDE HYDROCHLORIDE 2 MG/1
2 CAPSULE ORAL EVERY 4 HOURS PRN
Status: DISCONTINUED | OUTPATIENT
Start: 2025-07-16 | End: 2025-07-17 | Stop reason: HOSPADM

## 2025-07-16 RX ADMIN — HEPARIN SODIUM 5000 UNITS: 5000 INJECTION INTRAVENOUS; SUBCUTANEOUS at 06:17

## 2025-07-16 RX ADMIN — GABAPENTIN 300 MG: 300 CAPSULE ORAL at 22:04

## 2025-07-16 RX ADMIN — GABAPENTIN 300 MG: 300 CAPSULE ORAL at 15:05

## 2025-07-16 RX ADMIN — Medication 3 MG: at 22:12

## 2025-07-16 RX ADMIN — COLLAGENASE SANTYL: 250 OINTMENT TOPICAL at 15:08

## 2025-07-16 RX ADMIN — BUPRENORPHINE 20 MCG: 10 PATCH, EXTENDED RELEASE TRANSDERMAL at 16:54

## 2025-07-16 RX ADMIN — VANCOMYCIN HYDROCHLORIDE 1250 MG: 5 INJECTION, POWDER, LYOPHILIZED, FOR SOLUTION INTRAVENOUS at 21:59

## 2025-07-16 RX ADMIN — CYCLOBENZAPRINE 10 MG: 10 TABLET, FILM COATED ORAL at 15:05

## 2025-07-16 RX ADMIN — HEPARIN SODIUM 5000 UNITS: 5000 INJECTION INTRAVENOUS; SUBCUTANEOUS at 15:06

## 2025-07-16 RX ADMIN — LOPERAMIDE HYDROCHLORIDE 4 MG: 2 CAPSULE ORAL at 02:56

## 2025-07-16 RX ADMIN — CLONIDINE HYDROCHLORIDE 0.1 MG: 0.1 TABLET ORAL at 06:17

## 2025-07-16 RX ADMIN — VANCOMYCIN HYDROCHLORIDE 1250 MG: 5 INJECTION, POWDER, LYOPHILIZED, FOR SOLUTION INTRAVENOUS at 08:31

## 2025-07-16 RX ADMIN — CYCLOBENZAPRINE 10 MG: 10 TABLET, FILM COATED ORAL at 22:04

## 2025-07-16 RX ADMIN — GABAPENTIN 300 MG: 300 CAPSULE ORAL at 08:27

## 2025-07-16 RX ADMIN — TRAMADOL HYDROCHLORIDE 50 MG: 50 TABLET, COATED ORAL at 01:18

## 2025-07-16 RX ADMIN — CLONIDINE HYDROCHLORIDE 0.1 MG: 0.1 TABLET ORAL at 15:05

## 2025-07-16 RX ADMIN — CYCLOBENZAPRINE 10 MG: 10 TABLET, FILM COATED ORAL at 08:27

## 2025-07-16 RX ADMIN — NICOTINE 1 PATCH: 21 PATCH, EXTENDED RELEASE TRANSDERMAL at 22:13

## 2025-07-16 RX ADMIN — HEPARIN SODIUM 5000 UNITS: 5000 INJECTION INTRAVENOUS; SUBCUTANEOUS at 22:04

## 2025-07-16 RX ADMIN — Medication 3 MG: at 01:18

## 2025-07-16 RX ADMIN — CLONIDINE HYDROCHLORIDE 0.1 MG: 0.1 TABLET ORAL at 22:04

## 2025-07-16 NOTE — CONSULTS
Consult - Podiatry   Mayur Mauro Jr. 35 y.o. male MRN: [id number]  Unit/Bed#: -01 Encounter: [id number]    Assessment & Plan     Assessment:  Left medial ankle/lower leg wound w/ cellulitis  Narcotic abuse  Tobacco abuce    Plan:  - Left medial ankle/lower leg wound appears relatively clean with fibrous/eschar to base, no purulence or deep structures involved. Periwound erythema noted. DBT as below. Santyl dsd QD. Abx per ID.     - MRI and ABIs ordered. If MRI negative, can f/u outpt SL-OW [facility]. If DONNA abn, consult vasc.  - XR left ankle 2v 7/15/25: no osseous erosion note JERSON noted. Superficial linear radiodense metallic fragment proximal pretibial region, far away from wound  - CT LLE 7/15/25: Wound to medial distal lower leg w/ subcutaneous edema, likely cellulitic. No OM or abscess. No JERSON.   - [facility] ED notes from 10/8/23 & 6/20/25 reviewed  - f/u wcx, abx per SLIM/ID  - rest of care per IM and consulting teams    Wound debridement note: After verbal consent was obtained, wound located at left medial ankle/lower leg (2x2x0.3) was excisionally debrided with 15 blade of nonviable, devitalized, eschar, slough/fibrin/fibrous tissue w/ excision of subcutaneous tissue to depth of subcutaneous tissue. Post debridement wound measurements 2x2x0.5cm, with appearance of wound viable, granular with viable 80% vs nonviable 20%, <10sq cm debrided.    History of Present Illness     HPI:  Mayur Mauro Jr. is a 35 y.o. male w/ PMH of drug abuse, smoking who presents with nonhealing left ankle wound. Notes wound started 2-3 months ago from a cut and has not healed. Was on Keflex per [facility] ED 6/20/25. Notes some soreness    Inpatient consult to Podiatry  Consult performed by: Alda Taveras DPM  Consult ordered by: LEONEL Edmondson        Review of Systems   Constitutional: Negative.    HENT: Negative.    Eyes: Negative.    Respiratory: Negative.    Cardiovascular: Negative.    Gastrointestinal: Negative.   "  Musculoskeletal: L ankle pain   Skin:LLE wound   Neurological: Negative.   Psych: negative.       Historical Information   Past Medical History[1]  Past Surgical History[2]  Social History   Social History     Substance and Sexual Activity   Alcohol Use Never     Social History     Substance and Sexual Activity   Drug Use Not Currently    Types: Methamphetamines     Tobacco Use History[3]  Family History: Family History[4]    Meds/Allergies     Medications Prior to Admission:     diclofenac (VOLTAREN) 75 mg EC tablet  Allergies[5]    Objective   First Vitals:   Blood Pressure: 135/93 (07/15/25 1823)  Pulse: 101 (07/15/25 1823)  Temperature: 98.6 °F (37 °C) (07/15/25 1823)  Temp Source: Temporal (07/15/25 1823)  Respirations: 18 (07/15/25 1823)  Height: 5' 8\" (172.7 cm) (07/15/25 2219)  Weight - Scale: 63.1 kg (139 lb 1.8 oz) (07/15/25 2014)  SpO2: 99 % (07/15/25 1823)    Current Vitals:   Blood Pressure: 114/69 (07/16/25 0728)  Pulse: 71 (07/16/25 0728)  Temperature: 97.9 °F (36.6 °C) (07/16/25 0728)  Temp Source: Oral (07/15/25 2200)  Respirations: 18 (07/16/25 0728)  Height: 5' 8\" (172.7 cm) (07/15/25 2219)  Weight - Scale: 63.7 kg (140 lb 6.9 oz) (07/15/25 2219)  SpO2: 98 % (07/16/25 0728)        /69   Pulse 71   Temp 97.9 °F (36.6 °C)   Resp 18   Ht 5' 8\" (1.727 m)   Wt 63.7 kg (140 lb 6.9 oz)   SpO2 98%   BMI 21.35 kg/m²      General Appearance:    Alert, cooperative, no distress   Head:    Normocephalic, without obvious abnormality, atraumatic   Eyes:    PERRL, conjunctiva/corneas clear, EOM's intact        Nose:   Moist mucous membranes   Neck:   Supple, symmetrical, trachea midline   Back:     Symmetric   Lungs:     Respirations unlabored   Heart:    Regular rate and rhythm, S1 and S2 normal, no murmur, rub   or gallop   Abdomen:     Soft, non-tender    B/L LE EXAM   Extremities:   Slight left ankle pain around wound   Pulses:   palpable   Skin:   Left medial ankle/lower leg wound, fibrous, " eschar base, periwound erythema. No purulence, crepitus or fluctuance. No deep structures involved   Neurologic:   Gross sensation is intact.      Left medial ankle/lower leg      Lab Results:   Admission on 07/15/2025   Component Date Value    WBC 07/15/2025 4.88     RBC 07/15/2025 4.55     Hemoglobin 07/15/2025 13.3     Hematocrit 07/15/2025 41.1     MCV 07/15/2025 90     MCH 07/15/2025 29.2     MCHC 07/15/2025 32.4     RDW 07/15/2025 13.0     MPV 07/15/2025 11.1     Platelets 07/15/2025 225     nRBC 07/15/2025 0     Segmented % 07/15/2025 70     Immature Grans % 07/15/2025 0     Lymphocytes % 07/15/2025 17     Monocytes % 07/15/2025 12     Eosinophils Relative 07/15/2025 0     Basophils Relative 07/15/2025 1     Absolute Neutrophils 07/15/2025 3.39     Absolute Immature Grans 07/15/2025 0.01     Absolute Lymphocytes 07/15/2025 0.83     Absolute Monocytes 07/15/2025 0.60     Eosinophils Absolute 07/15/2025 0.02     Basophils Absolute 07/15/2025 0.03     Sodium 07/15/2025 140     Potassium 07/15/2025 4.0     Chloride 07/15/2025 106     CO2 07/15/2025 27     ANION GAP 07/15/2025 7     BUN 07/15/2025 12     Creatinine 07/15/2025 1.03     Glucose 07/15/2025 95     Calcium 07/15/2025 9.4     AST 07/15/2025 11 (L)     ALT 07/15/2025 12     Alkaline Phosphatase 07/15/2025 52     Total Protein 07/15/2025 7.5     Albumin 07/15/2025 4.7     Total Bilirubin 07/15/2025 0.34     eGFR 07/15/2025 94     LACTIC ACID 07/15/2025 0.9     Procalcitonin 07/15/2025 <0.05     Protime 07/15/2025 14.3     INR 07/15/2025 1.05     PTT 07/15/2025 27     BNP 07/15/2025 5     Lipase 07/15/2025 116 (H)     Magnesium 07/15/2025 2.2     Phosphorus 07/15/2025 4.2     Cholesterol 07/15/2025 129     Triglycerides 07/15/2025 50     HDL, Direct 07/15/2025 57     LDL Calculated 07/15/2025 62     Hemoglobin A1C 07/15/2025 5.9 (H)     EAG 07/15/2025 123     TSH 3RD GENERATION 07/15/2025 0.633     Color, UA 07/15/2025 Tania     Clarity, UA 07/15/2025  Clear     Specific Gravity, UA 07/15/2025 >=1.030     pH, UA 07/15/2025 6.0     Leukocytes, UA 07/15/2025 Negative     Nitrite, UA 07/15/2025 Negative     Protein, UA 07/15/2025 Trace (A)     Glucose, UA 07/15/2025 Negative     Ketones, UA 07/15/2025 Negative     Urobilinogen, UA 07/15/2025 0.2     Bilirubin, UA 07/15/2025 Negative     Occult Blood, UA 07/15/2025 Negative     RBC, UA 07/15/2025 0-1     WBC, UA 07/15/2025 0-1     Epithelial Cells 07/15/2025 Occasional     Bacteria, UA 07/15/2025 Occasional     Amph/Meth UR 07/16/2025 Positive (A)     Barbiturate Ur 07/16/2025 Negative     Benzodiazepine Urine 07/16/2025 Negative     Cocaine Urine 07/16/2025 Negative     Methadone Urine 07/16/2025 Negative     Opiate Urine 07/16/2025 Negative     PCP Ur 07/16/2025 Negative     THC Urine 07/16/2025 Positive (A)     Oxycodone Urine 07/16/2025 Negative     Fentanyl Urine 07/16/2025 Positive (A)     HYDROCODONE URINE 07/16/2025 Negative     Sodium 07/16/2025 141     Potassium 07/16/2025 3.7     Chloride 07/16/2025 105     CO2 07/16/2025 27     ANION GAP 07/16/2025 9     BUN 07/16/2025 12     Creatinine 07/16/2025 0.84     Glucose 07/16/2025 103     Calcium 07/16/2025 9.4     AST 07/16/2025 11 (L)     ALT 07/16/2025 11     Alkaline Phosphatase 07/16/2025 53     Total Protein 07/16/2025 7.2     Albumin 07/16/2025 4.5     Total Bilirubin 07/16/2025 0.77     eGFR 07/16/2025 115     WBC 07/16/2025 4.60     RBC 07/16/2025 4.77     Hemoglobin 07/16/2025 14.1     Hematocrit 07/16/2025 42.4     MCV 07/16/2025 89     MCH 07/16/2025 29.6     MCHC 07/16/2025 33.3     RDW 07/16/2025 13.0     Platelets 07/16/2025 201     MPV 07/16/2025 10.7     Ventricular Rate 07/16/2025 62     Atrial Rate 07/16/2025 62     SC Interval 07/16/2025 168     QRSD Interval 07/16/2025 98     QT Interval 07/16/2025 428     QTC Interval 07/16/2025 434     P Axis 07/16/2025 72     QRS Axis 07/16/2025 64     T Wave Axis 07/16/2025 59                    Invalid  "input(s): \"LABAEARO\"            Imaging: I have personally reviewed pertinent films in PACS  EKG, Pathology, and Other Studies: I have personally reviewed pertinent reports.      Code Status: Level 1 - Full Code  Advance Directive and Living Will:      Power of :    POLST:                 [1]   Past Medical History:  Diagnosis Date    Drug abuse (HCC)     Substance abuse (HCC)    [2] No past surgical history on file.  [3]   Social History  Tobacco Use   Smoking Status Every Day    Current packs/day: 1.50    Types: Cigarettes   Smokeless Tobacco Never   [4] No family history on file.  [5] No Known Allergies    "

## 2025-07-16 NOTE — ASSESSMENT & PLAN NOTE
"  Patient presents to the ED with a nonhealing wound to his left ankle.  Patient reports wound started 2-1/2 months ago as a cut.  Patient reports being on Bactrim for an entire week but continues to have purulent drainage from wound along with erythema.   Left ankle x-ray without any fractures  Vrad CT left lower extremity: \"Bones/joints: No definite acute fracture or malalignment. Bones are demineralized within portions. Os trigonum. Bones demonstrate minimal degeneration. Distal femoral nonspecific tiny sclerotic focus, which may reflect bone island. No definite bony erosions. No definite bone destruction. No definite periosteal reaction. Small knee joint effusion with suggestion of capsular thickening. Soft tissues: Medial to distal tibia, small soft tissue ulceration. Some associated fat stranding, illdefined fluid, skin thickening. No definite soft tissue gas. No radiopaque foreign body. Grossly, major tendons about ankle region appear intact. 1. Soft tissue ulceration as above with associated cellulitis/edema. 2. No definite radiographic finding to suggest osteomyelitis.\"  Pain management  Elevate extremity  Wound cultures pending  MRSA and blood cultures pending  Fall precautions  Received Zosyn and vancomycin in the ED, continue vancomycin  Appreciate wound care recommendations  Appreciate ID recommendations  Appreciate podiatry recommendations  "

## 2025-07-16 NOTE — PROGRESS NOTES
Mayur Mauro  is a 35 y.o. male who is currently ordered Vancomycin IV with management by the Pharmacy Consult service.  Relevant clinical data and objective / subjective history reviewed.  Vancomycin Assessment:  Indication and Goal AUC/Trough: Soft tissue (goal -600, trough >10)  Clinical Status: improving  Micro:   Cultures pending  Renal Function:  SCr: 0.84 mg/dL  CrCl: 112.7 mL/min  Renal replacement: Not on dialysis  Days of Therapy: 2  Current Dose: 1000 mg IV q 12 hours  Vancomycin Plan:  New Dosin mg IV q 12 hours  Estimated AUC: 484 mcg*hr/mL  Estimated Trough: 10.9 mcg/mL  Next Level: 25 at 0600  Renal Function Monitoring: Daily BMP and UOP  Pharmacy will continue to follow closely for s/sx of nephrotoxicity, infusion reactions and appropriateness of therapy.  BMP and CBC will be ordered per protocol. We will continue to follow the patient’s culture results and clinical progress daily.    Danita Gamez, Pharmacist

## 2025-07-16 NOTE — ED PROVIDER NOTES
Time reflects when diagnosis was documented in both MDM as applicable and the Disposition within this note       Time User Action Codes Description Comment    7/15/2025  9:39 PM Alfonso Chairez Add [Z51.89] Visit for wound check     7/15/2025  9:39 PM Alfonso Chairez Add [L03.90] Cellulitis, unspecified cellulitis site     7/15/2025  9:40 PM Alfonso Chairez Add [L03.90] Wound cellulitis     7/15/2025  9:51 PM DayaPatricia yip Add [S91.002A] Ankle wound, left, initial encounter           ED Disposition       ED Disposition   Admit    Condition   Stable    Date/Time   Tue Jul 15, 2025  9:40 PM    Comment   Discussed case with hospital medicine, patient will be admitted inpatient under Dr. More             Assessment & Plan       Medical Decision Making  35-year-old male presents to the emergency department with wound on left ankle, not healing, currently on Bactrim, differential diagnosis include osteomyelitis, MRSA, cellulitis, deep abscess, will perform x-ray, labs, start patient on antibiotics as he has failed outpatient treatment with Bactrim, and discussed case with hospital medicine.    Discussed case with hospital medicine, patient will be admitted for further evaluation.    Amount and/or Complexity of Data Reviewed  Labs: ordered.  Radiology: ordered.    Risk  Prescription drug management.  Decision regarding hospitalization.        ED Course as of 07/16/25 0054   Tue Jul 15, 2025   2140 Discussed case with hospital medicine, patient will be admitted for further evaluation.       Medications   vancomycin (VANCOCIN) IVPB (premix in dextrose) 1,000 mg 200 mL (has no administration in time range)   trimethobenzamide (TIGAN) IM injection 200 mg (has no administration in time range)   acetaminophen (TYLENOL) tablet 650 mg (has no administration in time range)   calcium carbonate (TUMS) chewable tablet 1,000 mg (has no administration in time range)   nicotine (NICODERM CQ) 21 mg/24 hr TD 24 hr patch 1 patch (has no  administration in time range)   heparin (porcine) subcutaneous injection 5,000 Units (has no administration in time range)   piperacillin-tazobactam (ZOSYN) IVPB 4.5 g (0 g Intravenous Stopped 7/15/25 2056)   vancomycin (VANCOCIN) IVPB (premix in dextrose) 1,000 mg 200 mL (0 mg Intravenous Stopped 7/15/25 2201)       ED Risk Strat Scores                    No data recorded        SBIRT 22yo+      Flowsheet Row Most Recent Value   Initial Alcohol Screen: US AUDIT-C     1. How often do you have a drink containing alcohol? 0 Filed at: 07/15/2025 1824   2. How many drinks containing alcohol do you have on a typical day you are drinking?  0 Filed at: 07/15/2025 1824   3a. Male UNDER 65: How often do you have five or more drinks on one occasion? 0 Filed at: 07/15/2025 1824   Audit-C Score 0 Filed at: 07/15/2025 1824   SPENCER: How many times in the past year have you...    Used an illegal drug or used a prescription medication for non-medical reasons? Never Filed at: 07/15/2025 1824                            History of Present Illness       Chief Complaint   Patient presents with    Wound Check     Pt presents with a wound on left ankle x1 month.        Past Medical History[1]   Past Surgical History[2]   Family History[3]   Social History[4]   E-Cigarette/Vaping      E-Cigarette/Vaping Substances      I have reviewed and agree with the history as documented.     35-year-old male presents to the emergency department with complaint of wound on left ankle going on for the past month.  Patient states over the past week, he started to have some purulent drainage coming from it.  Patient was started on Bactrim this week, with no effect or benefit.  Please see media tab of the wound.  Patient denies any chills, fever, nausea, vomiting, or other systemic complaints.  He denies any chest pain, shortness of breath, GI or  complaints.      Wound Check         Review of Systems   Constitutional:  Negative for chills and fever.    HENT:  Negative for ear pain and sore throat.    Eyes:  Negative for pain and visual disturbance.   Respiratory:  Negative for cough and shortness of breath.    Cardiovascular:  Negative for chest pain and palpitations.   Gastrointestinal:  Negative for abdominal pain and vomiting.   Genitourinary:  Negative for dysuria and hematuria.   Musculoskeletal:  Negative for arthralgias and back pain.   Skin:  Positive for wound. Negative for color change and rash.   Neurological:  Negative for seizures and syncope.   All other systems reviewed and are negative.          Objective       ED Triage Vitals   Temperature Pulse Blood Pressure Respirations SpO2 Patient Position - Orthostatic VS   07/15/25 1823 07/15/25 1823 07/15/25 1823 07/15/25 1823 07/15/25 1823 07/15/25 1823   98.6 °F (37 °C) 101 135/93 18 99 % Sitting      Temp Source Heart Rate Source BP Location FiO2 (%) Pain Score    07/15/25 1823 07/15/25 1823 07/15/25 1823 -- 07/15/25 2224    Temporal Monitor Left arm  No Pain      Vitals      Date and Time Temp Pulse SpO2 Resp BP Pain Score FACES Pain Rating User   07/15/25 2251 -- -- -- -- -- No Pain -- MF   07/15/25 2224 -- -- -- -- -- No Pain --    07/15/25 2224 97.9 °F (36.6 °C) 78 99 % 19 123/84 -- -- DII   07/15/25 2200 98.4 °F (36.9 °C) 81 100 % 18 125/79 -- -- NM   07/15/25 2025 98 °F (36.7 °C) 77 100 % 16 113/72 -- -- NM   07/15/25 1823 98.6 °F (37 °C) 101 99 % 18 135/93 -- -- MB            Physical Exam  Vitals and nursing note reviewed.   Constitutional:       General: He is not in acute distress.     Appearance: He is well-developed.   HENT:      Head: Normocephalic and atraumatic.     Eyes:      Conjunctiva/sclera: Conjunctivae normal.       Cardiovascular:      Rate and Rhythm: Normal rate and regular rhythm.   Pulmonary:      Effort: Pulmonary effort is normal. No respiratory distress.      Breath sounds: Normal breath sounds.   Abdominal:      Palpations: Abdomen is soft.      Tenderness: There is  no abdominal tenderness.     Musculoskeletal:         General: No swelling.      Cervical back: Neck supple.     Skin:     General: Skin is warm and dry.      Capillary Refill: Capillary refill takes less than 2 seconds.      Findings: Lesion and rash present.     Neurological:      Mental Status: He is alert.     Psychiatric:         Mood and Affect: Mood normal.               Results Reviewed       Procedure Component Value Units Date/Time    UA (URINE) with reflex to Scope [[id number]]  (Abnormal) Collected: 07/15/25 2241    Lab Status: Final result Specimen: Urine, Clean Catch Updated: 07/15/25 2247     Color, UA Tania     Clarity, UA Clear     Specific Gravity, UA >=1.030     pH, UA 6.0     Leukocytes, UA Negative     Nitrite, UA Negative     Protein, UA Trace mg/dl      Glucose, UA Negative mg/dl      Ketones, UA Negative mg/dl      Urobilinogen, UA 0.2 E.U./dl      Bilirubin, UA Negative     Occult Blood, UA Negative    TSH, 3rd generation with Free T4 reflex [[id number]]  (Normal) Collected: 07/15/25 2008    Lab Status: Final result Specimen: Blood from Arm, Left Updated: 07/15/25 2220     TSH 3RD GENERATION 0.633 uIU/mL     Lipid Panel with Direct LDL reflex [[id number]]  (Normal) Collected: 07/15/25 2008    Lab Status: Final result Specimen: Blood from Arm, Left Updated: 07/15/25 2211     Cholesterol 129 mg/dL      Triglycerides 50 mg/dL      HDL, Direct 57 mg/dL      LDL Calculated 62 mg/dL     Wound culture and Gram stain [[id number]] Collected: 07/15/25 2151    Lab Status: In process Specimen: Wound from Leg, Left Updated: 07/15/25 2210    Phosphorus [[id number]]  (Normal) Collected: 07/15/25 2008    Lab Status: Final result Specimen: Blood from Arm, Left Updated: 07/15/25 2152     Phosphorus 4.2 mg/dL     Hemoglobin A1C w/ EAG Estimation [[id number]] Collected: 07/15/25 2008    Lab Status: In process Specimen: Blood from Arm, Left Updated: 07/15/25 2146    Procalcitonin [[id number]]  (Normal) Collected:  07/15/25 2008    Lab Status: Final result Specimen: Blood from Arm, Left Updated: 07/15/25 2051     Procalcitonin <0.05 ng/ml     B-Type Natriuretic Peptide(BNP) [[id number]]  (Normal) Collected: 07/15/25 2008    Lab Status: Final result Specimen: Blood from Arm, Left Updated: 07/15/25 2047     BNP 5 pg/mL     Comprehensive metabolic panel [[id number]]  (Abnormal) Collected: 07/15/25 2008    Lab Status: Final result Specimen: Blood from Arm, Left Updated: 07/15/25 2041     Sodium 140 mmol/L      Potassium 4.0 mmol/L      Chloride 106 mmol/L      CO2 27 mmol/L      ANION GAP 7 mmol/L      BUN 12 mg/dL      Creatinine 1.03 mg/dL      Glucose 95 mg/dL      Calcium 9.4 mg/dL      AST 11 U/L      ALT 12 U/L      Alkaline Phosphatase 52 U/L      Total Protein 7.5 g/dL      Albumin 4.7 g/dL      Total Bilirubin 0.34 mg/dL      eGFR 94 ml/min/1.73sq m     Narrative:      [employer] guidelines for Chronic Kidney Disease (CKD):     Stage 1 with normal or high GFR (GFR > 90 mL/min/1.73 square meters)    Stage 2 Mild CKD (GFR = 60-89 mL/min/1.73 square meters)    Stage 3A Moderate CKD (GFR = 45-59 mL/min/1.73 square meters)    Stage 3B Moderate CKD (GFR = 30-44 mL/min/1.73 square meters)    Stage 4 Severe CKD (GFR = 15-29 mL/min/1.73 square meters)    Stage 5 End Stage CKD (GFR <15 mL/min/1.73 square meters)  Note: GFR calculation is accurate only with a steady state creatinine    Lipase [[id number]]  (Abnormal) Collected: 07/15/25 2008    Lab Status: Final result Specimen: Blood from Arm, Left Updated: 07/15/25 2041     Lipase 116 u/L     Magnesium [[id number]]  (Normal) Collected: 07/15/25 2008    Lab Status: Final result Specimen: Blood from Arm, Left Updated: 07/15/25 2041     Magnesium 2.2 mg/dL     Lactic acid [[id number]]  (Normal) Collected: 07/15/25 2008    Lab Status: Final result Specimen: Blood from Arm, Left Updated: 07/15/25 2040     LACTIC ACID 0.9 mmol/L     Narrative:      Result may be  elevated if tourniquet was used during collection.    Protime-INR [[id number]]  (Normal) Collected: 07/15/25 2008    Lab Status: Final result Specimen: Blood from Arm, Left Updated: 07/15/25 2035     Protime 14.3 seconds      INR 1.05    Narrative:      INR Therapeutic Range    Indication                                             INR Range      Atrial Fibrillation                                               2.0-3.0  Hypercoagulable State                                    2.0.2.3  Left Ventricular Asist Device                            2.0-3.0  Mechanical Heart Valve                                  -    Aortic(with afib, MI, embolism, HF, LA enlargement,    and/or coagulopathy)                                     2.0-3.0 (2.5-3.5)     Mitral                                                             2.5-3.5  Prosthetic/Bioprosthetic Heart Valve               2.0-3.0  Venous thromboembolism (VTE: VT, PE        2.0-3.0    APTT [[id number]]  (Normal) Collected: 07/15/25 2008    Lab Status: Final result Specimen: Blood from Arm, Left Updated: 07/15/25 2035     PTT 27 seconds     CBC and differential [[id number]] Collected: 07/15/25 2008    Lab Status: Final result Specimen: Blood from Arm, Left Updated: 07/15/25 2019     WBC 4.88 Thousand/uL      RBC 4.55 Million/uL      Hemoglobin 13.3 g/dL      Hematocrit 41.1 %      MCV 90 fL      MCH 29.2 pg      MCHC 32.4 g/dL      RDW 13.0 %      MPV 11.1 fL      Platelets 225 Thousands/uL      nRBC 0 /100 WBCs      Segmented % 70 %      Immature Grans % 0 %      Lymphocytes % 17 %      Monocytes % 12 %      Eosinophils Relative 0 %      Basophils Relative 1 %      Absolute Neutrophils 3.39 Thousands/µL      Absolute Immature Grans 0.01 Thousand/uL      Absolute Lymphocytes 0.83 Thousands/µL      Absolute Monocytes 0.60 Thousand/µL      Eosinophils Absolute 0.02 Thousand/µL      Basophils Absolute 0.03 Thousands/µL     Blood culture #2 [[id number]] Collected: 07/15/25 2004     Lab Status: In process Specimen: Blood from Arm, Left Updated: 07/15/25 2016    Blood culture #1 [[id number]] Collected: 07/15/25 2008    Lab Status: In process Specimen: Blood from Arm, Left Updated: 07/15/25 2016            XR ankle 2 vw left    (Results Pending)   CT lower extremity wo contrast left    (Results Pending)       Procedures    ED Medication and Procedure Management   Prior to Admission Medications   Prescriptions Last Dose Informant Patient Reported? Taking?   diclofenac (VOLTAREN) 75 mg EC tablet 7/15/2025 at  4:00 PM  No Yes   Sig: Take 1 tablet (75 mg total) by mouth 2 (two) times a day for 5 days      Facility-Administered Medications: None     Current Discharge Medication List        CONTINUE these medications which have NOT CHANGED    Details   diclofenac (VOLTAREN) 75 mg EC tablet Take 1 tablet (75 mg total) by mouth 2 (two) times a day for 5 days  Qty: 10 tablet, Refills: 0    Associated Diagnoses: Acute left ankle pain           No discharge procedures on file.  ED SEPSIS DOCUMENTATION   Time reflects when diagnosis was documented in both MDM as applicable and the Disposition within this note       Time User Action Codes Description Comment    7/15/2025  9:39 PM Alfonso Chairez Add [Z51.89] Visit for wound check     7/15/2025  9:39 PM Alfonso Chairez Add [L03.90] Cellulitis, unspecified cellulitis site     7/15/2025  9:40 PM Alfonso Chairez Add [L03.90] Wound cellulitis     7/15/2025  9:51 PM Patricia Rausch Add [S91.002A] Ankle wound, left, initial encounter                    [1]   Past Medical History:  Diagnosis Date    Drug abuse (HCC)     Substance abuse (HCC)    [2] No past surgical history on file.  [3] No family history on file.  [4]   Social History  Tobacco Use    Smoking status: Every Day     Current packs/day: 1.50     Types: Cigarettes    Smokeless tobacco: Never   Substance Use Topics    Alcohol use: Never    Drug use: Not Currently     Types: Methamphetamines        Alfonso Dao  Peyman Chairez,   07/16/25 0054

## 2025-07-16 NOTE — ASSESSMENT & PLAN NOTE
Patient reports a scab-like wound developing proximal to medial malleolus of left ankle. Appears clean without purulence or probe to bone  Of note there is a small linear radiopaque foreign body in anterior lower shin but not contiguous with the wound/soft tissue infection     Antibiotics and imaging as above

## 2025-07-16 NOTE — ASSESSMENT & PLAN NOTE
In the setting of nonhealing wound over left ankle.  Symptoms have failed to improve with outpatient Keflex .  CT is without abscess or definite evidence of osteo  Patient is at risk for MRSA.  Would need to rule out underlying osteo  He is systemically well, afebrile without leukocytosis     Continue vancomycin, dosing per pharmacy   Follow-up cultures   Check MRI of left lower extremity with contrast and arterial Dopplers   Recommend podiatry evaluation   Serial extremity exams, continue wound care   Check daily CBC, CMP while inpatient to monitor for any evolving antibiotic toxicity or treatment failure   Continue supportive care, monitor clinical course

## 2025-07-16 NOTE — PLAN OF CARE
Problem: PAIN - ADULT  Goal: Verbalizes/displays adequate comfort level or baseline comfort level  Description: Interventions:  - Encourage patient to monitor pain and request assistance  - Assess pain using appropriate pain scale  - Administer analgesics as ordered based on type and severity of pain and evaluate response  - Implement non-pharmacological measures as appropriate and evaluate response  - Consider cultural and social influences on pain and pain management  - Notify physician/advanced practitioner if interventions unsuccessful or patient reports new pain  - Educate patient/family on pain management process including their role and importance of  reporting pain   - Provide non-pharmacologic/complimentary pain relief interventions  7/16/2025 0559 by Oksana Hitchcock RN  Outcome: Progressing  7/15/2025 2311 by Oksana Hitchcock RN  Outcome: Progressing

## 2025-07-16 NOTE — ASSESSMENT & PLAN NOTE
The plan will be to start buprenorphine for MOUD. See below under opioid withdrawal  He has follow up at [facility] for ongoing buprenorphine MOUD. A bridge Rx should be given upon discharge so that he has enough medication to last him until follow up. I recommend a minimum of 2 week supply.   Please ensure he has naloxone available to him upon discharge, either via prescription or via take home naloxone program

## 2025-07-16 NOTE — ASSESSMENT & PLAN NOTE
"Patient denies any alcohol or drug abuse.  RN informed provider that patient was requesting  \"something so he doesn't go into opioid withdrawal\".   Patient reported taking 2 x 15 mg tablets daily.   PDMP reviewed - patient not currently on any narcotics  Patient's dispense report reviewed no current narcotics prescribed  When patient asked where narcotic prescription came from he reports he it is an old prescription from dental work he had in 2014.  Urine drug screen pending  COWS protocol  Appreciate case management recommendations  Appreciate toxicology recommendations  "

## 2025-07-16 NOTE — TELEMEDICINE
Tele-Consultation - Medical Toxicology   Name: Mayur Mauro Jr. 35 y.o. male I MRN: [id number]  Unit/Bed#: -01 I Date of Admission: 7/15/2025   Date of Service: 7/16/2025 I Hospital Day: 1   Inpatient consult to Toxicology  Consult performed by: Nils Lovelace DO  Consult ordered by: LEONEL Edmondson        Physician Requesting Evaluation: Pavithra More MD   Reason for Evaluation / Principal Problem: Opioid withdrawal    Assessment & Plan  Opioid use disorder, severe, dependence (HCC)  The plan will be to start buprenorphine for MOUD. See below under opioid withdrawal  He has follow up at [facility] for ongoing buprenorphine MOUD. A bridge Rx should be given upon discharge so that he has enough medication to last him until follow up. I recommend a minimum of 2 week supply.   Please ensure he has naloxone available to him upon discharge, either via prescription or via take home naloxone program  Opioid withdrawal (HCC)  It has been a little less than 24 hours since last use.   He already had appropriate opioid withdrawal meds ordered.   I have discontinued the tramadol as it is an opioid and will complicate buprenorphine initiation. I recommend multi-modal non-opioid pain management strategy until he is on buprenorphine.    We will place a transdermal buprenorphine patch now. We can consider low dose bup later today or in the AM depending on how he is doing.    Cellulitis of left leg    Current smoker    Ankle wound, left, sequela    I have discussed the above management plan in detail with the primary service. They are in agreement with buprenorphine initiation as planned.         For further questions, please contact the medical  on call via SecureChat between 8am and 9pm. If between 9pm and 8am, please reach out to the [facility] at [phone].     History of Present Illness   Mayur Mauro Jr. is a 35 y.o. year old male who presents with a LLE wound. He admitted for  cellulitis treatment and work up of possible osteomyelitis. He reported opioid use and med tox consultation placed for guidance. The patient reported using .25 to .50 grams of fentanyl per day via insufflation. He denies IVDU. His last use was around 1-2pm yesterday. Currently, he feels early withdrawal with some restlessness, and body aches.  He has been on buprenorphine previously and states he has an appt tomorrow at [facility] for re-engagement in buprenorphine treatment. He denies other substances, specifically ETOH and benzos.     Review of Systems   Constitutional:  Negative for chills and fever.   HENT:  Negative for ear pain and sore throat.    Eyes:  Negative for pain and visual disturbance.   Respiratory:  Negative for cough and shortness of breath.    Cardiovascular:  Negative for chest pain and palpitations.   Gastrointestinal:  Negative for abdominal pain and vomiting.   Genitourinary:  Negative for dysuria and hematuria.   Musculoskeletal:  Positive for arthralgias and myalgias. Negative for back pain.   Skin:  Negative for color change and rash.   Neurological:  Negative for seizures and syncope.   Psychiatric/Behavioral:  Positive for agitation. The patient is nervous/anxious.    All other systems reviewed and are negative.      Historical Information   Medical History Review: I have reviewed the patient's PMH, PSH, Social History, Family History, Meds, and Allergies   Social History[1]  Family History[2]    Meds/Allergies   Prior to Admission medications    Medication Sig Start Date End Date Taking? Authorizing Provider   diclofenac (VOLTAREN) 75 mg EC tablet Take 1 tablet (75 mg total) by mouth 2 (two) times a day for 5 days 10/5/23 7/15/25 Yes Harry Tee PA-C   Current Medications[3]   Allergies[4]    Objective :  Temp:  [97.9 °F (36.6 °C)-98.6 °F (37 °C)] 97.9 °F (36.6 °C)  HR:  [] 71  BP: (113-135)/(69-93) 114/69  Resp:  [16-19] 18  SpO2:  [98 %-100 %] 98 %  O2 Device: None (Room  air)      Intake/Output Summary (Last 24 hours) at 7/16/2025 1202  Last data filed at 7/15/2025 2201  Gross per 24 hour   Intake 300 ml   Output --   Net 300 ml       Physical Exam  Constitutional:       General: He is not in acute distress.  HENT:      Mouth/Throat:      Mouth: Mucous membranes are moist.     Eyes:      Extraocular Movements: Extraocular movements intact.       Cardiovascular:      Rate and Rhythm: Normal rate.   Pulmonary:      Effort: Pulmonary effort is normal.     Skin:     Coloration: Skin is not jaundiced.     Neurological:      Mental Status: He is alert and oriented to person, place, and time.     Psychiatric:         Behavior: Behavior normal.           Lab Results: I have reviewed the following results:  Results from last 7 days   Lab Units 07/16/25  0622 07/15/25  2008   WBC Thousand/uL 4.60 4.88   HEMOGLOBIN g/dL 14.1 13.3   HEMATOCRIT % 42.4 41.1   PLATELETS Thousands/uL 201 225   SEGS PCT %  --  70   LYMPHO PCT %  --  17   MONO PCT %  --  12   EOS PCT %  --  0      Results from last 7 days   Lab Units 07/16/25  0622 07/15/25  2008   POTASSIUM mmol/L 3.7 4.0   CHLORIDE mmol/L 105 106   CO2 mmol/L 27 27   BUN mg/dL 12 12   CREATININE mg/dL 0.84 1.03   CALCIUM mg/dL 9.4 9.4   ALBUMIN g/dL 4.5 4.7   ALK PHOS U/L 53 52   ALT U/L 11 12   AST U/L 11* 11*   MAGNESIUM mg/dL  --  2.2   PHOSPHORUS mg/dL  --  4.2      Results from last 7 days   Lab Units 07/15/25  2008   INR  1.05   PTT seconds 27     Results from last 7 days   Lab Units 07/15/25  2008   LACTIC ACID mmol/L 0.9     Results from last 7 days   Lab Units 07/15/25  2008   BNP pg/mL 5              Imaging Results Review: I personally reviewed the following image studies in PACS and associated radiology reports: CT LLE. My interpretation of the radiology images/reports is: Findings c/w cellulits from LLE wound.  Other Study Results Review: No additional pertinent studies reviewed.    Administrative Statements   I have spent a total time  of 45 minutes in caring for this patient on the day of the visit/encounter including Impressions, Documenting in the medical record, Obtaining or reviewing history  , and Communicating with other healthcare professionals .    Administrative Statements   VIRTUAL CARE DOCUMENTATION:     1. This service was provided via Telemedicine using Teams Virtual Rounding      2. Parties in the room with patient during teleconsult Patient only    3. Confidentiality My office door was closed     4. Participants No one else was in the room    5. Patient acknowledged consent and understanding of privacy and security of the  Telemedicine consult. I informed the patient that I have reviewed their record in Epic and presented the opportunity for them to ask any questions regarding the visit today.  The patient agreed to participate.              [1]   Social History  Tobacco Use    Smoking status: Every Day     Current packs/day: 1.50     Types: Cigarettes    Smokeless tobacco: Never   Substance and Sexual Activity    Alcohol use: Never    Drug use: Not Currently     Types: Methamphetamines   [2] No family history on file.  [3]   Current Facility-Administered Medications:     acetaminophen (TYLENOL) tablet 650 mg, 650 mg, Oral, Q6H PRN, [place] Nimako-Heather, CRNP, 650 mg at 07/15/25 2324    calcium carbonate (TUMS) chewable tablet 1,000 mg, 1,000 mg, Oral, Daily PRN, [place] Nimako-Heather, CRNP    cloNIDine (CATAPRES) tablet 0.1 mg, 0.1 mg, Oral, Q8H YESSENIA, [place] Nimako-Heather, CRNP, 0.1 mg at 07/16/25 0617    cyclobenzaprine (FLEXERIL) tablet 10 mg, 10 mg, Oral, TID, [place] Nimako-Heather, CRNP, 10 mg at 07/16/25 0827    gabapentin (NEURONTIN) capsule 300 mg, 300 mg, Oral, TID, [place] Nimako-Heather, CRNP, 300 mg at 07/16/25 0827    heparin (porcine) subcutaneous injection 5,000 Units, 5,000 Units, Subcutaneous, Q8H YESSENIA, 5,000 Units at 07/16/25 0617 **AND** [CANCELED] Platelet count, , , Once, [place] Nimako-Heather, CRNP    [COMPLETED] loperamide  (IMODIUM) capsule 4 mg, 4 mg, Oral, Once, 4 mg at 07/16/25 0256 **FOLLOWED BY** loperamide (IMODIUM) capsule 2 mg, 2 mg, Oral, Q4H PRN, [place] Nimako-Heather, CRNP    melatonin tablet 3 mg, 3 mg, Oral, HS, [place] Nimako-Heather, CRNP, 3 mg at 07/16/25 0118    nicotine (NICODERM CQ) 21 mg/24 hr TD 24 hr patch 1 patch, 1 patch, Transdermal, Daily, [place] Nimako-Heather, CRNP, 1 patch at 07/15/25 2325    traMADol (ULTRAM) tablet 50 mg, 50 mg, Oral, Q6H PRN, [place] Nimako-Heather, CRNP    trimethobenzamide (TIGAN) IM injection 200 mg, 200 mg, Intramuscular, Q6H PRN, [place] Nimako-Heather, CRNP    vancomycin (VANCOCIN) 1,250 mg in sodium chloride 0.9 % 250 mL IVPB, 1,250 mg, Intravenous, Q12H, Pavithra More MD, Stopped at 07/16/25 1100  [4] No Known Allergies

## 2025-07-16 NOTE — ASSESSMENT & PLAN NOTE
History of opiate prescription drug abuse, at risk for withdrawal.  Denies injection drug use.  Positive UDS     Toxicology and case management evaluation

## 2025-07-16 NOTE — ASSESSMENT & PLAN NOTE
Patient reports smoking half a pack of cigarettes a day  Patient is not ready to quit smoking  Smoking cessation education  Start nicotine patch

## 2025-07-16 NOTE — PLAN OF CARE
Problem: PAIN - ADULT  Goal: Verbalizes/displays adequate comfort level or baseline comfort level  Description: Interventions:  - Encourage patient to monitor pain and request assistance  - Assess pain using appropriate pain scale  - Administer analgesics as ordered based on type and severity of pain and evaluate response  - Implement non-pharmacological measures as appropriate and evaluate response  - Consider cultural and social influences on pain and pain management  - Notify physician/advanced practitioner if interventions unsuccessful or patient reports new pain  - Educate patient/family on pain management process including their role and importance of  reporting pain   - Provide non-pharmacologic/complimentary pain relief interventions  Outcome: Progressing     Problem: INFECTION - ADULT  Goal: Absence or prevention of progression during hospitalization  Description: INTERVENTIONS:  - Assess and monitor for signs and symptoms of infection  - Monitor lab/diagnostic results  - Monitor all insertion sites, i.e. indwelling lines, tubes, and drains  - Monitor endotracheal if appropriate and nasal secretions for changes in amount and color  - Byars appropriate cooling/warming therapies per order  - Administer medications as ordered  - Instruct and encourage patient and family to use good hand hygiene technique  - Identify and instruct in appropriate isolation precautions for identified infection/condition  Outcome: Progressing  Goal: Absence of fever/infection during neutropenic period  Description: INTERVENTIONS:  - Monitor WBC  - Perform strict hand hygiene  - Limit to healthy visitors only  - No plants, dried, fresh or silk flowers with guerra in patient room  Outcome: Progressing     Problem: SAFETY ADULT  Goal: Patient will remain free of falls  Description: INTERVENTIONS:  - Educate patient/family on patient safety including physical limitations  - Instruct patient to call for assistance with activity   -  Consider consulting OT/PT to assist with strengthening/mobility based on AM PAC & JH-HLM score  - Consult OT/PT to assist with strengthening/mobility   - Keep Call bell within reach  - Keep bed low and locked with side rails adjusted as appropriate  - Keep care items and personal belongings within reach  - Initiate and maintain comfort rounds  - Make Fall Risk Sign visible to staff  - Apply yellow socks and bracelet for high fall risk patients  - Consider moving patient to room near nurses station  Outcome: Progressing  Goal: Maintain or return to baseline ADL function  Description: INTERVENTIONS:  -  Assess patient's ability to carry out ADLs; assess patient's baseline for ADL function and identify physical deficits which impact ability to perform ADLs (bathing, care of mouth/teeth, toileting, grooming, dressing, etc.)  - Assess/evaluate cause of self-care deficits   - Assess range of motion  - Assess patient's mobility; develop plan if impaired  - Assess patient's need for assistive devices and provide as appropriate  - Encourage maximum independence but intervene and supervise when necessary  - Involve family in performance of ADLs  - Assess for home care needs following discharge   - Consider OT consult to assist with ADL evaluation and planning for discharge  - Provide patient education as appropriate  - Monitor functional capacity and physical performance, use of AM PAC & JH-HLM   - Monitor gait, balance and fatigue with ambulation    Outcome: Progressing  Goal: Maintains/Returns to pre admission functional level  Description: INTERVENTIONS:  - Perform AM-PAC 6 Click Basic Mobility/ Daily Activity assessment daily.  - Set and communicate daily mobility goal to care team and patient/family/caregiver.   - Collaborate with rehabilitation services on mobility goals if consulted  - Out of bed for toileting  - Record patient progress and toleration of activity level   Outcome: Progressing     Problem: DISCHARGE  PLANNING  Goal: Discharge to home or other facility with appropriate resources  Description: INTERVENTIONS:  - Identify barriers to discharge w/patient and caregiver  - Arrange for needed discharge resources and transportation as appropriate  - Identify discharge learning needs (meds, wound care, etc.)  - Arrange for interpretive services to assist at discharge as needed  - Refer to Case Management Department for coordinating discharge planning if the patient needs post-hospital services based on physician/advanced practitioner order or complex needs related to functional status, cognitive ability, or social support system  Outcome: Progressing     Problem: Knowledge Deficit  Goal: Patient/family/caregiver demonstrates understanding of disease process, treatment plan, medications, and discharge instructions  Description: Complete learning assessment and assess knowledge base.  Interventions:  - Provide teaching at level of understanding  - Provide teaching via preferred learning methods  Outcome: Progressing

## 2025-07-16 NOTE — H&P
"H&P - Hospitalist   Name: Mayur Mauro Jr. 35 y.o. male I MRN: [id number]  Unit/Bed#: -01 I Date of Admission: 7/15/2025   Date of Service: 7/16/2025 I Hospital Day: 1     Assessment & Plan  Acute osteomyelitis of left lower leg (HCC)    Patient presents to the ED with a nonhealing wound to his left ankle.  Patient reports wound started 2-1/2 months ago as a cut.  Patient reports being on Bactrim for an entire week but continues to have purulent drainage from wound along with erythema.   Left ankle x-ray without any fractures  Vrad CT left lower extremity: \"Bones/joints: No definite acute fracture or malalignment. Bones are demineralized within portions. Os trigonum. Bones demonstrate minimal degeneration. Distal femoral nonspecific tiny sclerotic focus, which may reflect bone island. No definite bony erosions. No definite bone destruction. No definite periosteal reaction. Small knee joint effusion with suggestion of capsular thickening. Soft tissues: Medial to distal tibia, small soft tissue ulceration. Some associated fat stranding, illdefined fluid, skin thickening. No definite soft tissue gas. No radiopaque foreign body. Grossly, major tendons about ankle region appear intact. 1. Soft tissue ulceration as above with associated cellulitis/edema. 2. No definite radiographic finding to suggest osteomyelitis.\"  Pain management  Elevate extremity  Wound cultures pending  MRSA and blood cultures pending  Fall precautions  Received Zosyn and vancomycin in the ED, continue vancomycin  Appreciate wound care recommendations  Appreciate ID recommendations  Appreciate podiatry recommendations  Current smoker  Patient reports smoking half a pack of cigarettes a day  Patient is not ready to quit smoking  Smoking cessation education  Start nicotine patch  Drug abuse (HCC)  Patient denies any alcohol or drug abuse.  RN informed provider that patient was requesting  \"something so he doesn't go into opioid withdrawal\". "   Patient reported taking 2 x 15 mg tablets daily.   PDMP reviewed - patient not currently on any narcotics  Patient's dispense report reviewed no current narcotics prescribed  When patient asked where narcotic prescription came from he reports he it is an old prescription from dental work he had in 2014.  Urine drug screen pending  COWS protocol  Appreciate case management recommendations  Appreciate toxicology recommendations    VTE Pharmacologic Prophylaxis: VTE Score: 0 on heparin subcutaneous  Code Status: Level 1 - Full Code Per patient   Discussion with family: Patient declined call to .     Anticipated Length of Stay: Patient will be admitted on an inpatient basis with an anticipated length of stay of greater than 2 midnights secondary to cellulitis.    History of Present Illness   Chief Complaint: Nonhealing left ankle wound    Mayur Mauro Jr. is a 35 y.o. male with a PMH of drug abuse, smoking who presents with nonhealing left ankle wound. Patient presents to the ED with a nonhealing wound to his left ankle.  Patient reports wound started 2-1/2 months ago as a cut.  Patient reports being on Bactrim for an entire week but continues to have purulent drainage from wound along with erythema.  CT scan shows cellulitis.  ICU pain medicine and antibiotics.    Review of Systems   Constitutional:  Negative for chills and fever.   HENT:  Negative for ear pain and sore throat.    Eyes:  Negative for pain and visual disturbance.   Respiratory:  Negative for cough, chest tightness and shortness of breath.    Cardiovascular:  Negative for chest pain and palpitations.   Gastrointestinal:  Negative for abdominal pain, nausea and vomiting.   Endocrine: Negative.    Genitourinary:  Negative for dysuria and hematuria.   Musculoskeletal:  Positive for arthralgias and myalgias. Negative for back pain.        Left ankle pain 7/10   Skin:  Positive for color change and wound. Negative for rash.    Allergic/Immunologic: Negative.    Neurological:  Negative for dizziness, seizures, syncope, light-headedness, numbness and headaches.   Hematological: Negative.    Psychiatric/Behavioral: Negative.     All other systems reviewed and are negative.    Historical Information   Past Medical History[1]  Past Surgical History[2]  Social History[3]  E-Cigarette/Vaping     E-Cigarette/Vaping Substances     Family History[4]  Social History:  Marital Status: Single   Occupation: Not working  Patient Pre-hospital Living Situation: Home, With other family member: Parents  Patient Pre-hospital Level of Mobility: walks  Patient Pre-hospital Diet Restrictions: None     Meds/Allergies   I have reviewed home medications with patient personally.  Prior to Admission medications    Medication Sig Start Date End Date Taking? Authorizing Provider   diclofenac (VOLTAREN) 75 mg EC tablet Take 1 tablet (75 mg total) by mouth 2 (two) times a day for 5 days 10/5/23 10/10/23  Harry Tee PA-C     No Known Allergies    Objective :  Temp:  [97.9 °F (36.6 °C)-98.6 °F (37 °C)] 97.9 °F (36.6 °C)  HR:  [] 78  BP: (113-135)/(72-93) 123/84  Resp:  [16-19] 19  SpO2:  [99 %-100 %] 99 %  O2 Device: None (Room air)    Physical Exam  Constitutional:       General: He is not in acute distress.     Appearance: He is not ill-appearing.     Cardiovascular:      Rate and Rhythm: Normal rate and regular rhythm.      Pulses: Normal pulses.      Heart sounds: Normal heart sounds. No murmur heard.  Pulmonary:      Effort: Pulmonary effort is normal. No respiratory distress.      Breath sounds: Normal breath sounds. No wheezing or rales.   Abdominal:      General: Bowel sounds are normal. There is no distension.      Palpations: Abdomen is soft.      Tenderness: There is no abdominal tenderness.     Musculoskeletal:         General: No swelling.      Left lower leg: Edema present.      Left ankle: Swelling present. Tenderness present.     Skin:      "General: Skin is warm and dry.      Findings: Erythema and wound present. No rash.     Neurological:      General: No focal deficit present.      Mental Status: He is alert. Mental status is at baseline.     Psychiatric:         Mood and Affect: Mood normal.        Lines/Drains:      Lab Results: I have reviewed the following results:  Results from last 7 days   Lab Units 07/15/25  2008   WBC Thousand/uL 4.88   HEMOGLOBIN g/dL 13.3   HEMATOCRIT % 41.1   PLATELETS Thousands/uL 225   SEGS PCT % 70   LYMPHO PCT % 17   MONO PCT % 12   EOS PCT % 0     Results from last 7 days   Lab Units 07/15/25  2008   SODIUM mmol/L 140   POTASSIUM mmol/L 4.0   CHLORIDE mmol/L 106   CO2 mmol/L 27   BUN mg/dL 12   CREATININE mg/dL 1.03   ANION GAP mmol/L 7   CALCIUM mg/dL 9.4   ALBUMIN g/dL 4.7   TOTAL BILIRUBIN mg/dL 0.34   ALK PHOS U/L 52   ALT U/L 12   AST U/L 11*   GLUCOSE RANDOM mg/dL 95     Results from last 7 days   Lab Units 07/15/25  2008   INR  1.05     No results found for: \"HGBA1C\"  Results from last 7 days   Lab Units 07/15/25  2008   LACTIC ACID mmol/L 0.9   PROCALCITONIN ng/ml <0.05     Imaging Results Review: I reviewed radiology reports from this admission including: CT left lower extremity and xray(s).  Other Study Results Review: No additional pertinent studies reviewed.    Administrative Statements     ** Please Note: This note has been constructed using a voice recognition system. **         [1]   Past Medical History:  Diagnosis Date    Drug abuse (HCC)     Substance abuse (HCC)    [2] No past surgical history on file.  [3]   Social History  Tobacco Use    Smoking status: Every Day     Current packs/day: 1.50     Types: Cigarettes    Smokeless tobacco: Never   Substance and Sexual Activity    Alcohol use: Never    Drug use: Not Currently     Types: Methamphetamines   [4] No family history on file.    "

## 2025-07-16 NOTE — ASSESSMENT & PLAN NOTE
It has been a little less than 24 hours since last use.   He already had appropriate opioid withdrawal meds ordered.   I have discontinued the tramadol as it is an opioid and will complicate buprenorphine initiation. I recommend multi-modal non-opioid pain management strategy until he is on buprenorphine.    We will place a transdermal buprenorphine patch now. We can consider low dose bup later today or in the AM depending on how he is doing.

## 2025-07-16 NOTE — CASE MANAGEMENT
Case Management Discharge Planning Note    Patient name Mayur Mauro Jr.  Location /-01 MRN [id number]  : 1990 Date 2025       Current Admission Date: 7/15/2025  Current Admission Diagnosis:Acute osteomyelitis of left lower leg (HCC)   Patient Active Problem List    Diagnosis Date Noted    Current smoker 2025    Drug abuse (HCC) 2025    Acute osteomyelitis of left lower leg (HCC) 07/15/2025    Bipolar disorder (HCC) 2022    Tobacco use 2022      LOS (days): 1  Geometric Mean LOS (GMLOS) (days):   Days to GMLOS:     OBJECTIVE:  Risk of Unplanned Readmission Score: 8.74         Current admission status: Inpatient   Preferred Pharmacy:   [id number] [address]  [address] [address]  Phone: [phone] Fax: [phone]    Primary Care Provider: No primary care provider on file.    Primary Insurance: [employer]  Secondary Insurance:     DISCHARGE DETAILS:  Additional Comments: CM attempted to meet with pt at bedside as their is a CM consult. Pt was very tired and struggling to keep his eyes open and wanted CM to come back later. CM to attempt CM open later on.    ADDENDUM 1:30pm: Attempted to meet with pt again, pt off floor for testing. CM will continue attempts to meet with pt.

## 2025-07-16 NOTE — UTILIZATION REVIEW
Initial Clinical Review    Admission: Date/Time/Statement:   Admission Orders (From admission, onward)       Ordered        07/15/25 2140  INPATIENT ADMISSION  Once                          Orders Placed This Encounter   Procedures    INPATIENT ADMISSION     Standing Status:   Standing     Number of Occurrences:   1     Level of Care:   Med Surg [16]     Estimated length of stay:   More than 2 Midnights     Certification:   I certify that inpatient services are medically necessary for this patient for a duration of greater than two midnights. See H&P and MD Progress Notes for additional information about the patient's course of treatment.     ED Arrival Information       Expected   -    Arrival   7/15/2025 18:06    Acuity   Urgent              Means of arrival   Walk-In    Escorted by   Family Member    Service   Hospitalist    Admission type   Emergency              Arrival complaint   wound check             Chief Complaint   Patient presents with    Wound Check     Pt presents with a wound on left ankle x1 month.        Initial Presentation: 35 y.o. male with a PMH of drug abuse, and smoking presents to the ED from home with complaint of nonhealing left ankle wound. Patient reports wound started 2-1/2 months ago as a cut.  Patient reports being on Bactrim for an entire week but continues to have purulent drainage from wound along with erythema.  ED CT scan shows cellulitis.  Patient received IV Zosyn and IV vancomycin in the ED.  Plan: Inpatient admission secondary to cellulitis. Continue vancomycin, wound, MRSA and blood cultures pending, pain management. Consult Podiatry and Infectious Disease.             7/16 Infectious Disease consult:  Cellulitis of left leg in setting of nonhealing wound over left ankle. Symptoms have failed to improve with outpatient Keflex.  Patient is at risk for MRSA, need to rule out underlying osteo.  Plan: Continue vancomycin, follow up cultures, check MRI LLE and arterial dopplers,  recommend Podiatry evaluation. Daily CBC CMP while inpatient. Podiatry consult:  Left medial ankle/lower leg wound w/ cellulitis.   After verbal consent was obtained, wound located at left medial ankle/lower leg (2x2x0.3) was excisionally debrided with 15 blade of nonviable, devitalized, eschar, slough/fibrin/fibrous tissue w/ excision of subcutaneous tissue to depth of subcutaneous tissue. Post debridement wound measurements 2x2x0.5cm, with appearance of wound viable, granular with viable 80% vs nonviable 20%, <10sq cm debrided. Plan: Left medial ankle/lower leg wound appears relatively clean with fibrous/eschar to base, no purulence or deep structures involved. Periwound erythema noted. DBT as below. Santyl dsd QD. Abx per ID.   MRI and ABIs ordered. If MRI negative, can f/u outpt [facility]. If DONNA abn, consult vasc. Internal Medicine:  MRI and ABIs pending. Podiatry and ID following. Continue IV antibiotics with vancomycin. Wound cultures pending. UDS  positive for amphetamines and fentanyl. Toxicology consult pending.  Medical Toxicology consult:   The patient reported using .25 to .50 grams of fentanyl per day via insufflation. He denies IVDU. His last use was around 1-2pm yesterday. Currently, he feels early withdrawal with some restlessness, and body aches.  He has been on buprenorphine previously and states he has an appt tomorrow at [facility] for re-engagement in buprenorphine treatment. He denies other substances, specifically ETOH and benzos. Plan:  Start buprenorphine for MOUD. Appropriate opioid withdrawal meds ordered.  Discontinued the tramadol as it is an opioid and will complicate buprenorphine initiation. Recommend multi-modal non-opioid pain management strategy until he is on buprenorphine.  We will place a transdermal buprenorphine patch now. We can consider low dose bup later today or in the AM depending on how he is doing.      ED Treatment-Medication Administration from 07/15/2025 6405 to  07/15/2025 2219         Date/Time Order Dose Route Action     07/15/2025 2026 piperacillin-tazobactam (ZOSYN) IVPB 4.5 g 4.5 g Intravenous New Bag     07/15/2025 2056 vancomycin (VANCOCIN) IVPB (premix in dextrose) 1,000 mg 200 mL 1,000 mg Intravenous New Bag            Scheduled Medications:    cloNIDine, 0.1 mg, Oral, Q8H YESSENIA  collagenase, , Topical, Daily  cyclobenzaprine, 10 mg, Oral, TID  gabapentin, 300 mg, Oral, TID  heparin (porcine), 5,000 Units, Subcutaneous, Q8H YESSENIA  melatonin, 3 mg, Oral, HS  nicotine, 1 patch, Transdermal, Daily  transdermal buprenorphine, 20 mcg, Transdermal, Once  vancomycin, 1,250 mg, Intravenous, Q12H    traMADol (ULTRAM) tablet 50 mg  Dose: 50 mg  Freq: Once Route: PO  Indications of Use: ACUTE PAIN,PAIN  Start: 07/16/25 0100 End: 07/16/25 0118    Continuous IV Infusions:  None.     PRN Meds:    acetaminophen, 650 mg, Oral, Q6H PRN  calcium carbonate, 1,000 mg, Oral, Daily PRN  loperamide, 2 mg, Oral, Q4H PRN x 1 dose 7/16  trimethobenzamide, 200 mg, Intramuscular, Q6H PRN          ED Triage Vitals   Temperature Pulse Respirations Blood Pressure SpO2 Pain Score   07/15/25 1823 07/15/25 1823 07/15/25 1823 07/15/25 1823 07/15/25 1823 07/15/25 2224   98.6 °F (37 °C) 101 18 135/93 99 % No Pain     Weight (last 2 days)       Date/Time Weight    07/15/25 2219 63.7 (140.43)    07/15/25 2014 63.1 (139.11)            Vital Signs (last 3 days)       Date/Time Temp Pulse Resp BP MAP (mmHg) SpO2 O2 Device Patient Position - Orthostatic VS Clinical Opiate Withdrawal Scale Total Score Pain    07/16/25 15:05:06 97.9 °F (36.6 °C) 86 16 112/69 83 99 % -- -- -- --    07/16/25 0830 -- -- -- -- -- 99 % None (Room air) -- -- No Pain    07/16/25 0800 -- 67 -- -- -- -- -- -- 0 --    07/16/25 07:28:38 97.9 °F (36.6 °C) 71 18 114/69 84 98 % -- -- -- --    07/16/25 06:19:47 -- 58 -- 118/73 88 99 % -- -- -- --    07/16/25 0617 -- -- -- 118/73 -- -- -- -- -- --    07/16/25 0118 -- -- -- -- -- -- -- -- -- 7     07/16/25 0100 -- -- -- -- -- -- -- -- 4 --    07/15/25 2251 -- -- -- -- -- -- -- -- -- No Pain    07/15/25 22:24:24 97.9 °F (36.6 °C) 78 19 123/84 97 99 % None (Room air) -- -- No Pain    07/15/25 2200 98.4 °F (36.9 °C) 81 18 125/79 -- 100 % None (Room air) Sitting -- --    07/15/25 2025 98 °F (36.7 °C) 77 16 113/72 -- 100 % None (Room air) Lying -- --    07/15/25 1823 98.6 °F (37 °C) 101 18 135/93 -- 99 % None (Room air) Sitting -- --              Pertinent Labs/Diagnostic Test Results:     Radiology:  MRI ankle/heel left  wo contrast   Final Interpretation by Camilo Suh MD (07/16 1524)      Ulcer along the medial aspect of the distal lower leg without localized fluid collection. No evidence of osteomyelitis.      Plantar fibroma.      Mild flexor digitorum longus tenosynovitis.         Resident: RADHA Suh I, the attending radiologist, have reviewed the images and agree with the final report above.      Workstation performed: [id number]         CT lower extremity wo contrast left   Final Interpretation by Jaiden Littlejohn MD (07/16 0819)      There is a wound on the medial aspect of the distal lower leg (series 3 images 174) with mild adjacent subcutaneous edema could be cellulitis, without abscess formation. No evidence of deeper infection.            Workstation performed: [id number]         XR ankle 2 vw left   Final Interpretation by Curry Walker MD (07/16 0916)      Soft tissue ulceration and swelling medially without evidence of soft tissue gas or osteomyelitis.      4 mm radiopaque foreign body superficially in the lower shin         Computerized Assisted Algorithm (CAA) may have been used to analyze all applicable images.               Workstation performed: [id number]         VAS DONNA and waveform analysis, multiple levels    (Results Pending)       Cardiology:    ECG 12 lead   Final Result by Adam Rae MD (07/16 0848)   Normal sinus rhythm with sinus arrhythmia   Normal ECG    No previous ECGs available   Confirmed by Adam Rae ([id number]) on 7/16/2025 8:48:00 AM            Results from last 7 days   Lab Units 07/16/25  0622 07/15/25  2008   WBC Thousand/uL 4.60 4.88   HEMOGLOBIN g/dL 14.1 13.3   HEMATOCRIT % 42.4 41.1   PLATELETS Thousands/uL 201 225   TOTAL NEUT ABS Thousands/µL  --  3.39         Results from last 7 days   Lab Units 07/16/25  0622 07/15/25  2008   SODIUM mmol/L 141 140   POTASSIUM mmol/L 3.7 4.0   CHLORIDE mmol/L 105 106   CO2 mmol/L 27 27   ANION GAP mmol/L 9 7   BUN mg/dL 12 12   CREATININE mg/dL 0.84 1.03   EGFR ml/min/1.73sq m 115 94   CALCIUM mg/dL 9.4 9.4   MAGNESIUM mg/dL  --  2.2   PHOSPHORUS mg/dL  --  4.2     Results from last 7 days   Lab Units 07/16/25  0622 07/15/25  2008   AST U/L 11* 11*   ALT U/L 11 12   ALK PHOS U/L 53 52   TOTAL PROTEIN g/dL 7.2 7.5   ALBUMIN g/dL 4.5 4.7   TOTAL BILIRUBIN mg/dL 0.77 0.34         Results from last 7 days   Lab Units 07/16/25  0622 07/15/25  2008   GLUCOSE RANDOM mg/dL 103 95           Results from last 7 days   Lab Units 07/15/25  2008   PROTIME seconds 14.3   INR  1.05   PTT seconds 27     Results from last 7 days   Lab Units 07/15/25  2008   TSH 3RD GENERATON uIU/mL 0.633     Results from last 7 days   Lab Units 07/15/25  2008   PROCALCITONIN ng/ml <0.05     Results from last 7 days   Lab Units 07/15/25  2008   LACTIC ACID mmol/L 0.9             Results from last 7 days   Lab Units 07/15/25  2008   BNP pg/mL 5                     Results from last 7 days   Lab Units 07/15/25  2008   LIPASE u/L 116*                 Results from last 7 days   Lab Units 07/15/25  2241   CLARITY UA  Clear   COLOR UA  Tania   SPEC GRAV UA  >=1.030   PH UA  6.0   GLUCOSE UA mg/dl Negative   KETONES UA mg/dl Negative   BLOOD UA  Negative   PROTEIN UA mg/dl Trace*   NITRITE UA  Negative   BILIRUBIN UA  Negative   UROBILINOGEN UA E.U./dl 0.2   LEUKOCYTES UA  Negative   WBC UA /hpf 0-1   RBC UA /hpf 0-1   BACTERIA UA /hpf Occasional    EPITHELIAL CELLS WET PREP /hpf Occasional             Results from last 7 days   Lab Units 07/16/25  0148   AMPH/METH  Positive*   BARBITURATE UR  Negative   BENZODIAZEPINE UR  Negative   COCAINE UR  Negative   METHADONE URINE  Negative   OPIATE UR  Negative   PCP UR  Negative   THC UR  Positive*         Past Medical History[1]  Present on Admission:   Cellulitis of left leg   Current smoker   Drug abuse (HCC)      Admitting Diagnosis: Wound cellulitis [L03.90]  Visit for wound check [Z51.89]  Ankle wound, left, initial encounter [S91.002A]  Cellulitis, unspecified cellulitis site [L03.90]  Age/Sex: 35 y.o. male        Network Utilization Review Department  ATTENTION: Please call with any questions or concerns to [phone] and carefully listen to the prompts so that you are directed to the right person. All voicemails are confidential.   For Discharge needs, contact Care Management DC Support Team at [phone] opt. 2  Send all requests for admission clinical reviews, approved or denied determinations and any other requests to dedicated fax number below belonging to the [facility] where the patient is receiving treatment. List of dedicated fax numbers for the Facilities:  FACILITY NAME UR FAX NUMBER   ADMISSION DENIALS (Administrative/Medical Necessity) [phone]   DISCHARGE SUPPORT TEAM (NETWORK) [phone]   PARENT CHILD HEALTH (Maternity/NICU/Pediatrics) [phone]   [facility] [phone]   [facility] [phone]   [facility] [phone]   [facility] [phone]   [facility] [phone]   [facility] [phone]   [facility] [phone]   [facility] [phone]   [facility] [phone]   [facility] [phone]   [facility] [phone]   [facility] [phone]              [1]   Past Medical History:  Diagnosis Date    Drug abuse (HCC)     Substance abuse (HCC)

## 2025-07-16 NOTE — CONSULTS
Consultation - Infectious Disease   Name: Mayur Mauro Jr. 35 y.o. male I MRN: [id number]  Unit/Bed#: -01 I Date of Admission: 7/15/2025   Date of Service: 7/16/2025 I Hospital Day: 1   Inpatient consult to Infectious Diseases  Consult performed by: Bria Berry MD  Consult ordered by: LEONEL Edmondson        Administrative Statements   VIRTUAL CARE DOCUMENTATION:     1. This service was provided via Telemedicine using [employer]     2. Parties in the room with patient during teleconsult Patient only    3. Confidentiality My office door was closed     4. Participants No one else was in the room    5. Patient acknowledged consent and understanding of privacy and security of the  Telemedicine consult. I informed the patient that I have reviewed their record in Epic and presented the opportunity for them to ask any questions regarding the visit today.  The patient agreed to participate.    6. I have spent a total time of 90 minutes in caring for this patient on the day of the visit/encounter including Diagnostic results, Prognosis, Risk factor reductions, Impressions, Counseling / Coordination of care, Documenting in the medical record, Reviewing/placing orders in the medical record (including tests, medications, and/or procedures), Obtaining or reviewing history  , and Communicating with other healthcare professionals , not including the time spent for establishing the audio/video connection.       Physician Requesting Evaluation: Pavithra More MD   Reason for Evaluation / Principal Problem: wound infection, osteo    Assessment & Plan  Cellulitis of left leg  In the setting of nonhealing wound over left ankle.  Symptoms have failed to improve with outpatient Keflex .  CT is without abscess or definite evidence of osteo  Patient is at risk for MRSA.  Would need to rule out underlying osteo  He is systemically well, afebrile without leukocytosis     Continue vancomycin, dosing per  pharmacy   Follow-up cultures   Check MRI of left lower extremity with contrast and arterial Dopplers   Recommend podiatry evaluation   Serial extremity exams, continue wound care   Check daily CBC, CMP while inpatient to monitor for any evolving antibiotic toxicity or treatment failure   Continue supportive care, monitor clinical course    Ankle wound, left, sequela  Patient reports a scab-like wound developing proximal to medial malleolus of left ankle. Appears clean without purulence or probe to bone  Of note there is a small linear radiopaque foreign body in anterior lower shin but not contiguous with the wound/soft tissue infection     Antibiotics and imaging as above  Current smoker  Risk factor for infection     Continue to monitor for smoking cessation counseling  Drug abuse (HCC)  History of opiate prescription drug abuse, at risk for withdrawal.  Denies injection drug use.  Positive UDS     Toxicology and case management evaluation      Antibiotics:  Vancomycin    History of Present Illness   Mayur Mauro Jr. is a 35 y.o. year old male with history of tobacco, opiates and other substance abuse disorder.  Reported history of bipolar disorder with noncompliance of medications.  He has a history of left ankle cellulitis following trauma in 2023.  He return to seek medical care in the ER on 6/20 with a chronic left lower extremity wound over his ankle.  He had noted the wound starting out as a scab about few weeks ago, did not recall any inciting event.  Presented to the ER due to worsening pain, swelling, drainage and redness to the area.  This was initially treated with Keflex with no improvement which prompted him to return to the ER on 7/15.  A complete review of systems is negative other than that noted in the HPI.    Medical History Review: I have reviewed the patient's PMH, PSH, Social History, Family History, Meds, and Allergies     Objective :  Temp:  [97.9 °F (36.6 °C)-98.6 °F (37 °C)] 97.9 °F  (36.6 °C)  HR:  [] 71  BP: (113-135)/(69-93) 114/69  Resp:  [16-19] 18  SpO2:  [98 %-100 %] 98 %  O2 Device: None (Room air)    General:  No acute distress  Psychiatric:  Awake and alert  Pulmonary:  Normal respiratory excursion without accessory muscle use  Abdomen:  Soft, nontender  Extremities:  No edema  Skin:  Left medial ankle/lower leg wound clean with fibrous/eschar to base, no purulence or deep structures involved. Periwound erythema noted         Lab Results: I have reviewed the following results:  Results from last 7 days   Lab Units 07/16/25  0622 07/15/25  2008   WBC Thousand/uL 4.60 4.88   HEMOGLOBIN g/dL 14.1 13.3   PLATELETS Thousands/uL 201 225     Results from last 7 days   Lab Units 07/16/25  0622 07/15/25  2008   SODIUM mmol/L 141 140   POTASSIUM mmol/L 3.7 4.0   CHLORIDE mmol/L 105 106   CO2 mmol/L 27 27   BUN mg/dL 12 12   CREATININE mg/dL 0.84 1.03   EGFR ml/min/1.73sq m 115 94   CALCIUM mg/dL 9.4 9.4   AST U/L 11* 11*   ALT U/L 11 12   ALK PHOS U/L 53 52   ALBUMIN g/dL 4.5 4.7         Results from last 7 days   Lab Units 07/15/25  2008   PROCALCITONIN ng/ml <0.05

## 2025-07-16 NOTE — DISCHARGE INSTR - OTHER ORDERS
Santyl, saline moistened gauze, silicone border foam to left medial ankle wound daily.   Call to get an appt at [facility] for f/u in 1wk.               Please use to assist you in finding a primary care doctor and also outpatient resources in your area

## 2025-07-17 ENCOUNTER — TELEPHONE (OUTPATIENT)
Dept: PSYCHIATRY | Facility: CLINIC | Age: 35
End: 2025-07-17

## 2025-07-17 VITALS
TEMPERATURE: 97.9 F | OXYGEN SATURATION: 99 % | SYSTOLIC BLOOD PRESSURE: 113 MMHG | RESPIRATION RATE: 18 BRPM | DIASTOLIC BLOOD PRESSURE: 73 MMHG | HEART RATE: 101 BPM | WEIGHT: 140.43 LBS | HEIGHT: 68 IN | BODY MASS INDEX: 21.28 KG/M2

## 2025-07-17 LAB
ANION GAP SERPL CALCULATED.3IONS-SCNC: 7 MMOL/L (ref 4–13)
BUN SERPL-MCNC: 11 MG/DL (ref 5–25)
CALCIUM SERPL-MCNC: 9.2 MG/DL (ref 8.4–10.2)
CHLORIDE SERPL-SCNC: 107 MMOL/L (ref 96–108)
CO2 SERPL-SCNC: 27 MMOL/L (ref 21–32)
CREAT SERPL-MCNC: 0.77 MG/DL (ref 0.6–1.3)
ERYTHROCYTE [DISTWIDTH] IN BLOOD BY AUTOMATED COUNT: 13.1 % (ref 11.6–15.1)
GFR SERPL CREATININE-BSD FRML MDRD: 119 ML/MIN/1.73SQ M
GLUCOSE SERPL-MCNC: 110 MG/DL (ref 65–140)
HCT VFR BLD AUTO: 40.8 % (ref 36.5–49.3)
HGB BLD-MCNC: 13.5 G/DL (ref 12–17)
MCH RBC QN AUTO: 29.8 PG (ref 26.8–34.3)
MCHC RBC AUTO-ENTMCNC: 33.1 G/DL (ref 31.4–37.4)
MCV RBC AUTO: 90 FL (ref 82–98)
MRSA NOSE QL CULT: NORMAL
PLATELET # BLD AUTO: 217 THOUSANDS/UL (ref 149–390)
PMV BLD AUTO: 11 FL (ref 8.9–12.7)
POTASSIUM SERPL-SCNC: 3.9 MMOL/L (ref 3.5–5.3)
RBC # BLD AUTO: 4.53 MILLION/UL (ref 3.88–5.62)
SODIUM SERPL-SCNC: 141 MMOL/L (ref 135–147)
VANCOMYCIN SERPL-MCNC: 10.8 UG/ML (ref 10–20)
WBC # BLD AUTO: 6.02 THOUSAND/UL (ref 4.31–10.16)

## 2025-07-17 PROCEDURE — 99239 HOSP IP/OBS DSCHRG MGMT >30: CPT | Performed by: STUDENT IN AN ORGANIZED HEALTH CARE EDUCATION/TRAINING PROGRAM

## 2025-07-17 PROCEDURE — 80048 BASIC METABOLIC PNL TOTAL CA: CPT | Performed by: STUDENT IN AN ORGANIZED HEALTH CARE EDUCATION/TRAINING PROGRAM

## 2025-07-17 PROCEDURE — 80202 ASSAY OF VANCOMYCIN: CPT | Performed by: STUDENT IN AN ORGANIZED HEALTH CARE EDUCATION/TRAINING PROGRAM

## 2025-07-17 PROCEDURE — NC001 PR NO CHARGE: Performed by: STUDENT IN AN ORGANIZED HEALTH CARE EDUCATION/TRAINING PROGRAM

## 2025-07-17 PROCEDURE — 85027 COMPLETE CBC AUTOMATED: CPT | Performed by: STUDENT IN AN ORGANIZED HEALTH CARE EDUCATION/TRAINING PROGRAM

## 2025-07-17 PROCEDURE — 99233 SBSQ HOSP IP/OBS HIGH 50: CPT | Performed by: INTERNAL MEDICINE

## 2025-07-17 RX ORDER — VANCOMYCIN HYDROCHLORIDE 1 G/200ML
1000 INJECTION, SOLUTION INTRAVENOUS EVERY 8 HOURS
Status: DISCONTINUED | OUTPATIENT
Start: 2025-07-17 | End: 2025-07-17 | Stop reason: HOSPADM

## 2025-07-17 RX ORDER — SULFAMETHOXAZOLE AND TRIMETHOPRIM 800; 160 MG/1; MG/1
1 TABLET ORAL EVERY 12 HOURS SCHEDULED
Qty: 14 TABLET | Refills: 0 | Status: SHIPPED | OUTPATIENT
Start: 2025-07-17 | End: 2025-07-25

## 2025-07-17 RX ORDER — NICOTINE 21 MG/24HR
1 PATCH, TRANSDERMAL 24 HOURS TRANSDERMAL DAILY
Qty: 28 PATCH | Refills: 0 | Status: SHIPPED | OUTPATIENT
Start: 2025-07-17

## 2025-07-17 RX ORDER — CEPHALEXIN 500 MG/1
500 CAPSULE ORAL EVERY 6 HOURS SCHEDULED
Qty: 28 CAPSULE | Refills: 0 | Status: SHIPPED | OUTPATIENT
Start: 2025-07-17 | End: 2025-07-24

## 2025-07-17 RX ADMIN — HEPARIN SODIUM 5000 UNITS: 5000 INJECTION INTRAVENOUS; SUBCUTANEOUS at 05:23

## 2025-07-17 RX ADMIN — GABAPENTIN 300 MG: 300 CAPSULE ORAL at 17:03

## 2025-07-17 RX ADMIN — VANCOMYCIN HYDROCHLORIDE 1000 MG: 1 INJECTION, SOLUTION INTRAVENOUS at 17:04

## 2025-07-17 RX ADMIN — CYCLOBENZAPRINE 10 MG: 10 TABLET, FILM COATED ORAL at 08:28

## 2025-07-17 RX ADMIN — CYCLOBENZAPRINE 10 MG: 10 TABLET, FILM COATED ORAL at 17:03

## 2025-07-17 RX ADMIN — CLONIDINE HYDROCHLORIDE 0.1 MG: 0.1 TABLET ORAL at 14:01

## 2025-07-17 RX ADMIN — VANCOMYCIN HYDROCHLORIDE 1000 MG: 1 INJECTION, SOLUTION INTRAVENOUS at 09:21

## 2025-07-17 RX ADMIN — COLLAGENASE SANTYL: 250 OINTMENT TOPICAL at 09:21

## 2025-07-17 RX ADMIN — GABAPENTIN 300 MG: 300 CAPSULE ORAL at 08:28

## 2025-07-17 NOTE — CASE MANAGEMENT
Case Management Assessment & Discharge Planning Note    Patient name Mayur Mauro Jr.  Location /-01 MRN [id number]  : 1990 Date 2025       Current Admission Date: 7/15/2025  Current Admission Diagnosis:Cellulitis of left leg   Patient Active Problem List    Diagnosis Date Noted    Current smoker 2025    Drug abuse (HCC) 2025    Ankle wound, left, sequela 2025    Opioid use disorder, severe, dependence (HCC) 2025    Opioid withdrawal (HCC) 2025    Cellulitis of left leg 07/15/2025    Bipolar disorder (HCC) 2022    Tobacco use 2022      LOS (days): 2  Geometric Mean LOS (GMLOS) (days):   Days to GMLOS:     OBJECTIVE:    Risk of Unplanned Readmission Score: 8.33         Current admission status: Inpatient       Preferred Pharmacy:   [id number] [address] [address]  Phone: [phone] Fax: [phone]    Primary Care Provider: No primary care provider on file.    Primary Insurance: [employer]  Secondary Insurance:     ASSESSMENT:  Active Health Care Proxies    There are no active Health Care Proxies on file.       Advance Directives  Does patient have a Health Care POA?: No  Was patient offered paperwork?: Yes (declined)  Does patient currently have a Health Care decision maker?: No  Does patient have Advance Directives?: No  Was patient offered paperwork?: Yes (declined)  Primary Contact: sister kramer              Patient Information  Admitted from:: Home  Mental Status: Alert  During Assessment patient was accompanied by: Not accompanied during assessment  Assessment information provided by:: Patient  Primary Caregiver: Self  Support Systems: Parent, Family members  County of Residence: [facility]  Home entry access options. Select all that apply.: Stairs  Number of steps to enter home.: 5  Type of Current Residence: 2 story home  Upon entering residence, is  there a bedroom on the main floor (no further steps)?: No  A bedroom is located on the following floor levels of residence (select all that apply):: 2nd Floor  Upon entering residence, is there a bathroom on the main floor (no further steps)?: No  Indicate which floors of current residence have a bathroom (select all the apply):: 2nd Floor  Number of steps to 2nd floor from main floor: One Flight  Living Arrangements: Lives w/ Parent(s)  Is patient a ?: No    Activities of Daily Living Prior to Admission  Functional Status: Independent  Ambulates independently?: Yes  Does patient use assisted devices?: No  Does patient currently own DME?: No  Does patient have a history of Outpatient Therapy (PT/OT)?: No  Does the patient have a history of Short-Term Rehab?: No  Does patient have a history of HHC?: No  Does patient currently have HHC?: No         Patient Information Continued  Does patient have prescription coverage?: Yes  Can the patient afford their medications and any related supplies (such as glucometers or test strips)?: Yes  Does patient receive dialysis treatments?: No  Does patient have a history of substance abuse?: Yes  Historical substance use preference: Methamphetamines, Fentanyl, Marijuana  Is patient currently in treatment for substance abuse?: Patient provided treatment options.  Does patient have a history of Mental Health Diagnosis?: No         Means of Transportation  Means of Transport to Appts:: Family transport          DISCHARGE DETAILS:    Discharge planning discussed with:: patieint  Freedom of Choice: Yes     CM contacted family/caregiver?: No- see comments (declined)             Contacts  Patient Contacts: sisterwilliams  Relationship to Patient:: Family    Requested Home Health Care         Is the patient interested in HHC at discharge?: No    DME Referral Provided  Referral made for DME?: No    Other Referral/Resources/Interventions Provided:  Interventions: D[facility]A [facility]  "[facility]  Referral Comments: [facility]    Would you like to participate in our [facility] service program?  : No - Declined    Treatment Team Recommendation: Home  Expected Discharge Disposition: Home or Self Care  Additional Discharge Dispositions: Substance Abuse Treatment         Pt is from home, lives with parents.  Pt IPTA.  Pts family provides transportation    CM giving pt information on [facility], pt is agreeable to have someone from [facility] reach out to him at time of discharge.  Pts UDS + for fentanyl,THC and Meth, pt denies use.  CM also giving pt a Narcan Kit, pt thankful.  Pt does not wish to discuss drug use with CM.  Pt polite during assessment.CM offering to set up pt with a PCP.  Pt sts he is looking to establish primary care with [facility] in [place].   CM offering pt HHC for his wound, pt declining.     CM emailing pt info to [facility]         [id number] CM received call from pts sister, Amy.  Amy has concerns with brothers drug use and lack of self care.  Pt sts he does not live with his parents, he lives on the streets and \"dumpster dives.\" Pt has been to [facility] \"[facility]\" IP  in 2022 for 21 days.  Sister sts pt has been on Suboxone in the past.  CM updating attending                                               "

## 2025-07-17 NOTE — QUICK NOTE
Podiatry update:    MRI left ankle 7/16/25 reviewed: no evidence of OM of abscess.     ABIs 7/16/25 reviewed: RLE 1.26/128/79. LLE 1.19/135/105. WNL for healing.      Rec f/u outp [facility]. C/w santyl daily. C/w abx recs per ID.

## 2025-07-17 NOTE — ASSESSMENT & PLAN NOTE
In the setting of nonhealing wound over left ankle.  Symptoms have failed to improve with outpatient Keflex .  CT and MRI is without abscess or definite evidence of osteo  LEADs are within normal limits  Patient is at risk for MRSA and gram negatives   He is systemically well, afebrile without leukocytosis     Continue vancomycin, dosing per pharmacy   Follow-up cultures   On discharge pt can be transitioned to po keflex 500 qid and po bactrim 1 ds bid x 7 days   Serial extremity exams, continue wound care and outpatient follow up   Check daily CBC, CMP while inpatient to monitor for any evolving antibiotic toxicity or treatment failure   Continue supportive care, monitor clinical course

## 2025-07-17 NOTE — TELEPHONE ENCOUNTER
Consult placed on [facility] queue at 1506 to be seen by an [facility] psychiatrist.

## 2025-07-17 NOTE — DISCHARGE SUMMARY
"Discharge Summary - Hospitalist   Name: Mayur Mauro Jr. 35 y.o. male I MRN: [id number]  Unit/Bed#: [facility]-01 I Date of Admission: 7/15/2025   Date of Service: 7/17/2025 I Hospital Day: 2     Assessment & Plan  Cellulitis of left leg    Patient presents to the ED with a nonhealing wound to his left ankle.  Patient reports wound started 2-1/2 months ago as a cut.  Patient reports being on Bactrim for an entire week but continues to have purulent drainage from wound along with erythema.   Left ankle x-ray without any fractures  CT left lower extremity: There is a wound on the medial aspect of the distal lower leg (series 3 images 174) with mild adjacent subcutaneous edema could be cellulitis, without abscess formation. No evidence of deeper infection.   Wound cultures no polyps or bacteria  MRSA pending   blood cultures negative at 24 hours  Fall precautions  Patient was treated with IV vancomycin while hospitalized  Patient was seen by podiatry, recommended for MRI which was negative for osteomyelitis therefore recommended for outpatient follow-up with wound care  Patient seen by ID and recommended discharge home on p.o. Keflex and Bactrim double strength x 7 days  Current smoker  Patient reports smoking half a pack of cigarettes a day  Patient is not ready to quit smoking  Smoking cessation education  Continue nicotine patch  Drug abuse (HCC)  Opioid use disorder, severe, dependence (HCC)  Opioid withdrawal (HCC)  Patient denies any alcohol or drug abuse.  RN informed provider that patient was requesting  \"something so he doesn't go into opioid withdrawal\".   Patient reported taking 2 x 15 mg tablets daily.   PDMP reviewed - patient not currently on any narcotics  Patient's dispense report reviewed no current narcotics prescribed  When patient asked where narcotic prescription came from he reports he it is an old prescription from dental work he had in 2014.  Urine drug screen positive for fentanyl and " amphetamines  Patient was seen by toxicology and started on buprenorphine patch, recommended for buprenorphine induction however patient does not want to remain hospitalized for this  Patient given Narcan kit on discharge  Ankle wound, left, sequela  Patient reports a scab-like wound developing proximal to medial malleolus of left ankle. Appears clean without purulence or probe to bone  Of note there is a small linear radiopaque foreign body in anterior lower shin but not contiguous with the wound/soft tissue infection  Antibiotics and imaging as above     Medical Problems      Discharging Physician / Practitioner: Pavithra More MD  PCP: No primary care provider on file.  Admission Date:   Admission Orders (From admission, onward)       Ordered        07/15/25 2140  INPATIENT ADMISSION  Once                          Discharge Date: 07/17/25    Next Steps for Physician/AP Assuming Care:  Please ensure patient follows up with wound care    Test Results Pending at Discharge (will require follow up):  Blood cultures x 2, wound culture, MRSA swab    Medication Changes for Discharge & Rationale:     See after visit summary for reconciled discharge medications provided to patient and/or family.     Consultations During Hospital Stay:  ID, podiatry, toxicology    Procedures Performed:   None    Significant Findings / Test Results:   VAS DONNA and waveform analysis, multiple levels   Final Result by Linette Hurt DO (07/16 2150)      MRI ankle/heel left  wo contrast   Final Result by Camilo Suh MD (07/16 1524)      Ulcer along the medial aspect of the distal lower leg without localized fluid collection. No evidence of osteomyelitis.      Plantar fibroma.      Mild flexor digitorum longus tenosynovitis.         Resident: RADHA Suh I, the attending radiologist, have reviewed the images and agree with the final report above.      Workstation performed: [id number]         CT lower extremity wo contrast left    Final Result by Jaiden Littlejohn MD (07/16 0819)      There is a wound on the medial aspect of the distal lower leg (series 3 images 174) with mild adjacent subcutaneous edema could be cellulitis, without abscess formation. No evidence of deeper infection.            Workstation performed: [id number]         XR ankle 2 vw left   Final Result by Curry Walker MD (07/16 0916)      Soft tissue ulceration and swelling medially without evidence of soft tissue gas or osteomyelitis.      4 mm radiopaque foreign body superficially in the lower shin         Computerized Assisted Algorithm (CAA) may have been used to analyze all applicable images.               Workstation performed: [id number]               Incidental Findings:   As above      Hospital Course:   Mayur Mauro Jr. is a 35 y.o. male patient who originally presented to the hospital on 7/15/2025 due to nonhealing left ankle wound.  He reports this wound began approximately 2-1/2 months ago as a cut.  He has a history of  left ankle cellulitis following trauma in 2023.  He reports that he followed in the ED on 06/20 and was started on antibiotics.  He reports there was no improvement with antibiotics therefore presented to the ED due to worsening pain, swelling and drainage and erythema of the area.  On admission he was noted to have cellulitis, and ID and podiatry were engaged.  Podiatry recommended MRI which was negative for osteomyelitis therefore patient just needs to follow-up with wound care in the outpatient setting.  ID recommended that patient can be discharged home on Keflex and Bactrim DS x 7 days for treatment.  Patient was also seen by toxicology due to drug use and opiate withdrawal, recommended for buprenorphine induction however patient was not agreeable to staying hospitalized for this.  Patient was given a Narcan kit prior to discharge.        The patient, initially admitted to the hospital as inpatient, was discharged earlier than  "expected given the following: Improvement in symptoms.  Please see above list of diagnoses and related plan for additional information.     Discharge Day Visit / Exam:   Subjective: Patient seen and examined at bedside.  No acute events overnight.  Patient eager to be discharged home today  Vitals: Blood Pressure: 103/69 (07/17/25 0736)  Pulse: 64 (07/17/25 0736)  Temperature: 97.9 °F (36.6 °C) (07/17/25 0736)  Temp Source: Oral (07/15/25 2200)  Respirations: 18 (07/17/25 0736)  Height: 5' 8\" (172.7 cm) (07/16/25 1421)  Weight - Scale: 63.7 kg (140 lb 6.9 oz) (07/15/25 2219)  SpO2: 100 % (07/17/25 0830)  Physical Exam  Vitals and nursing note reviewed.   Constitutional:       General: He is not in acute distress.     Appearance: He is ill-appearing. He is not toxic-appearing.   HENT:      Head: Normocephalic and atraumatic.     Eyes:      Extraocular Movements: Extraocular movements intact.      Pupils: Pupils are equal, round, and reactive to light.       Cardiovascular:      Rate and Rhythm: Normal rate and regular rhythm.   Pulmonary:      Effort: Pulmonary effort is normal. No respiratory distress.      Breath sounds: No wheezing.   Abdominal:      General: There is no distension.      Palpations: Abdomen is soft.      Tenderness: There is no abdominal tenderness.     Musculoskeletal:         General: No swelling.     Skin:     General: Skin is warm.      Findings: Erythema and lesion present.     Neurological:      General: No focal deficit present.      Mental Status: He is alert and oriented to person, place, and time. Mental status is at baseline.     Psychiatric:         Mood and Affect: Mood normal.         Behavior: Behavior normal.                  Discussion with Family: Patient declined call to .     Discharge instructions/Information to patient and family:   See after visit summary for information provided to patient and family.      Provisions for Follow-Up Care:  See after visit " summary for information related to follow-up care and any pertinent home health orders.      Mobility at time of Discharge:   Basic Mobility Inpatient Raw Score: 24  JH-HLM Goal: 8: Walk 250 feet or more  JH-HLM Achieved: 8: Walk 250 feet ot more  HLM Goal achieved. Continue to encourage appropriate mobility.     Disposition:   Home    Planned Readmission: No    Administrative Statements       **Please Note: This note may have been constructed using a voice recognition system**

## 2025-07-17 NOTE — PROGRESS NOTES
Mayur Mauro  is a 35 y.o. male who is currently ordered Vancomycin IV with management by the Pharmacy Consult service.  Relevant clinical data and objective / subjective history reviewed.  Vancomycin Assessment:  Indication and Goal AUC/Trough: Soft tissue (goal -600, trough >10)  Clinical Status: improving  Micro:   Cultures pending  Renal Function:  SCr: 0.77 mg/dL  CrCl: 122.9 mL/min  Renal replacement: Not on dialysis  Days of Therapy: 3  Current Dose: 1250 mg IV q 12 hours  Vancomycin Plan:  New Dosin mg IV q 8 hours  Estimated AUC: 470 mcg*hr/mL  Estimated Trough: 11.6 mcg/mL  Next Level: 25 at 0600  Renal Function Monitoring: Daily BMP and UOP  Pharmacy will continue to follow closely for s/sx of nephrotoxicity, infusion reactions and appropriateness of therapy.  BMP and CBC will be ordered per protocol. We will continue to follow the patient’s culture results and clinical progress daily.    Danita Gamez, Pharmacist

## 2025-07-17 NOTE — NURSING NOTE
Patient discharged to home with Mom. AVS reviewed with patient and his mother. Dressing change procedure reviewed with patient and importance of compliance with wound care, antibiotics, wound care visits and PCP visits stressed with patient. Verbalized agreement to same. IV removed prior to discharge. Narcan kit sent with patient.

## 2025-07-17 NOTE — ASSESSMENT & PLAN NOTE
Patient reports smoking half a pack of cigarettes a day  Patient is not ready to quit smoking  Smoking cessation education  Continue nicotine patch

## 2025-07-17 NOTE — QUICK NOTE
Toxicology update      Patient willing to stay for induction of buprenorphine.    Recommend that patient be discharged home with a take-home naloxone kit for harm reduction purposes    Recommend case management/ assistance with providing aftercare resources for outpatient follow-up.    Reach out with any additional questions or concerns      DO [facility] Toxicology Service

## 2025-07-17 NOTE — ASSESSMENT & PLAN NOTE
"Patient denies any alcohol or drug abuse.  RN informed provider that patient was requesting  \"something so he doesn't go into opioid withdrawal\".   Patient reported taking 2 x 15 mg tablets daily.   PDMP reviewed - patient not currently on any narcotics  Patient's dispense report reviewed no current narcotics prescribed  When patient asked where narcotic prescription came from he reports he it is an old prescription from dental work he had in 2014.  Urine drug screen positive for fentanyl and amphetamines  Patient was seen by toxicology and started on buprenorphine patch, recommended for buprenorphine induction however patient does not want to remain hospitalized for this  Patient given Narcan kit on discharge  "

## 2025-07-17 NOTE — CONSULTS
TeleConsultation - Behavioral Health   Name: Mayur Mauro Jr. 35 y.o. male I MRN: [id number]  Unit/Bed#: -01 I Date of Admission: 7/15/2025   Date of Service: 7/17/2025 I Hospital Day: 2  Inpatient consult to Psychiatry  Consult performed by: Hermila Mooney MD  Consult ordered by: Pavithra More MD        Physician Requesting Consult: Pavithra More MD  Principal Problem:Cellulitis of left leg  Reason for Consult: Psych Evaluation     Assessment & Plan   Opioid Use Disorder    Patient presents with ongoing opiate use as well as nonhealing wound that led to hospital presentation.  Patient not currently interested in Suboxone or endorsing any psychiatric complaints warranting involuntary inpatient psychiatric admission unsafe for discharge home.     TREATMENT PLAN RECOMMENDATIONS:  Medications: None   PRN for sleep: None  PRN for agitation: None     Informed consent for the above medication has been obtained including discussion of the risks, benefits and alternatives: Yes    Disposition: The patient does not currently meet criteria for inpatient psychiatric hospitalization. They deny thoughts or plans for suicide and denies homicidal thoughts or intent. They are not unable to care for themselves due to a mental illness and/or acute psychosis. They are able to adequately participate in care planning with primary team. No criteria for an involuntary psychiatric commitment exists at this time.    Legal Status Recommendation: Voluntary    Multiple Antipsychotic Review: N/A    Psychotherapy/Psychoeducation: Provided to patient based on their needs and abilities in a manner that they could understand and accommodated their learning style.    Other/Medical Work Up and/or treatment modality recommendations: N/A to this case.    Patient Caregiver/Family Education: Provided education regarding relevant aspects of the treatment plan to identified patient support based on their needs and abilities in a manner  that they could understand with the patient's express consent.    Follow-up: Re-consult PRN    Report regarding the above Assessment and Treatment plan was provided to: Dr. More     History of Present Illness      Patient states that he came in for an infection on his foot and that it wasn't healing so he came in. Patient states that the team has done everything that they need to and hopes it heals. Patient states that he plans to follow up with his PCP for discussions regarding Suboxone.  Patient reports he has no ongoing mental health complaints no desire for treatment but knows where to find resources if needed. Patient denied any current SI/HI/AVH or other acute psychiatric complaints.       Psychiatric Review Of Systems:  sleep: no  appetite changes: no  weight changes: no  energy/anergy: no  interest/pleasure/anhedonia: no  somatic symptoms: no  anxiety/panic: no  lita: no  guilty/hopeless: no  self injurious behavior/risky behavior: no    Historical Information   Past Psychiatric History:   Psychiatric Hospitalizations:   Several past inpatient psychiatric admissions  Outpatient Treatment History:   Denied   Suicide Attempts:   None  History of self-harm:   None  Violence History:   no  Past Psychiatric medication trials: Yes     Substance Abuse History: Denied       Family Psychiatric History: Denied       Social History:  Education: high school diploma/GED  Learning Disabilities: Denied   Marital history: single  Children: Yes   Living arrangement, social support: The patient lives in home with extended family.  Occupational History: unemployed  Functioning Relationships: good support system.  Other Pertinent History: None    Traumatic History:   Abuse: None  Other Traumatic Events: none    Patient's prior history (PMH, PSH, SH, FH, etc) not obtainable due to Clinical Condition    Meds/Allergies   Current Facility-Administered Medications   Medication Dose Route Frequency Provider Last Rate     acetaminophen  650 mg Oral Q6H PRN [place] Nimako-Heather, CRNP      calcium carbonate  1,000 mg Oral Daily PRN [place] Nimako-Heather, CRNP      cloNIDine  0.1 mg Oral Q8H YESSENIA [place] Nimako-Heather, CRNP      collagenase   Topical Daily Alda Goangelo, DPM      cyclobenzaprine  10 mg Oral TID [place] Nimako-Heather, CRNP      gabapentin  300 mg Oral TID [place] Nimako-Heather, CRNP      heparin (porcine)  5,000 Units Subcutaneous Q8H YESSENIA [place] Nimako-Heather, CRNP      loperamide  2 mg Oral Q4H PRN [place] Nimako-Heather, CRNP      melatonin  3 mg Oral HS [place] Nimako-Heather, CRNP      nicotine  1 patch Transdermal Daily [place] Nimako-Heather, CRNP      transdermal buprenorphine  20 mcg Transdermal Once Nils Lovelace DO      trimethobenzamide  200 mg Intramuscular Q6H PRN [place] Nimako-Heather, CRNP      vancomycin  1,000 mg Intravenous Q8H Pavithra More MD 1,000 mg (07/17/25 1704)      Allergies[1]    Objective :  Temp:  [97.7 °F (36.5 °C)-97.9 °F (36.6 °C)] 97.9 °F (36.6 °C)  HR:  [] 101  BP: (103-119)/(60-73) 113/73  Resp:  [18-19] 18  SpO2:  [97 %-100 %] 99 %  O2 Device: None (Room air)    Mental Status Evaluation:  Appearance:  age appropriate   Behavior:  normal   Speech:  normal pitch and normal volume   Mood:  normal   Affect:  normal   Language: naming objects   Thought Process:  normal   Associations intact associations   Thought Content:  normal   Perceptual Disturbances: None   Risk Potential: Suicidal Ideations none  Homicidal Ideations none  Potential for Aggression No   Sensorium:  person, place, and time/date   Cognition:  recent and remote memory grossly intact   Consciousness:  alert    Attention: attention span and concentration were age appropriate   Intellect: within normal limits   Fund of Knowledge: awareness of current events: President   Insight:  limited   Judgment: limited   Muscle Strength:  Muscle Tone: normal NFT  normal   Gait/Station: normal gait/station   Motor Activity: no abnormal movements     Lab  "Results: I have reviewed the following lab results:   .     07/17/25  0444   WBC 6.02   HGB 13.5   HCT 40.8      SODIUM 141   K 3.9      CO2 27   BUN 11   CREATININE 0.77   GLUC 110      Results from last 7 days   Lab Units 07/16/25  0148   BARBITURATE UR  Negative   BENZODIAZEPINE UR  Negative   THC UR  Positive*   COCAINE UR  Negative   METHADONE URINE  Negative   OPIATE UR  Negative   PCP UR  Negative     Lipid Profile:   Lab Results   Component Value Date    CHOLESTEROL 129 07/15/2025    HDL 57 07/15/2025    TRIG 50 07/15/2025    LDLCALC 62 07/15/2025   Thyroid Studies:   Lab Results   Component Value Date    EKT6VJTPTOMZ 0.633 07/15/2025     Ammonia: No results found for: \"AMMONIA\"  Drug Levels: No results found for: \"VALPROICTOT\", \"VALPROICACID\", \"LITHIUM\", \"CARBAMAZEPIN\", \"CLOZAPINE\", \"NCLOZIP\"    Imaging Results Review: No pertinent imaging studies reviewed.  Other Study Results Review: No additional pertinent studies reviewed.    Code Status: Level 1 - Full Code  Advance Directive and Living Will:      Power of :    POLST:      Screenings:   1. Nutrition Assessment (completed by Staff):   Nutrition  Feeding: Able to feed self  Diet Type: Regular/House  2. Pain Screening  Pain Assessment  Pain Assessment Tool: 0-10  Pain Score: 0  Pain Location/Orientation: Location: Leg, Orientation: Left  3. Suicide Screening  ED Crisis Suicide Risk Assessment:      C-SSRS Screening (Nursing Assessment - recent):    C-SSRS Screening (Nursing Assessment - since last contact):      Suicide Risk Assessment completed by the Consultant: Based on today's assessment, Mayur presents the following risk of harm to self: none.    Administrative Statements   VIRTUAL CARE DOCUMENTATION:     1. This service was provided via Telemedicine using [employer] TV Kit     2. Parties in the room with patient during teleconsult Other: Family      3. Confidentiality My office door was closed     4. Participants No one else was in " the room    5. Patient acknowledged consent and understanding of privacy and security of the  Telemedicine consult. I informed the patient that I have reviewed their record in Epic and presented the opportunity for them to ask any questions regarding the visit today.  The patient agreed to participate.    6. I have spent a total time of 30 minutes in caring for this patient on the day of the visit/encounter including Obtaining or reviewing history  , not including the time spent for establishing the audio/video connection.          [1] No Known Allergies

## 2025-07-17 NOTE — UTILIZATION REVIEW
NOTIFICATION OF INPATIENT ADMISSION   AUTHORIZATION REQUEST   SERVICING FACILITY:   [place] [address]  Tax ID: [id number]  NPI: [id number] ATTENDING PROVIDER:  Attending Name and NPI#: Pavithra More Md [[id number]]  Address: [address] [place] [address]  Phone: [phone]   ADMISSION INFORMATION:  Place of Service: Inpatient [facility]  Place of Service Code: 21  Inpatient Admission Date/Time: 7/15/25  9:40 PM  Discharge Date/Time: No discharge date for patient encounter.  Admitting Diagnosis Code/Description:  Wound cellulitis [L03.90]  Visit for wound check [Z51.89]  Ankle wound, left, initial encounter [S91.002A]  Cellulitis, unspecified cellulitis site [L03.90]     UTILIZATION REVIEW CONTACT:  Chari Sykes, Utilization   Network Utilization Review Department  Phone: [phone]  Fax [phone]  Email: [email or web address]  Contact for approvals/pending authorizations, clinical reviews, and discharge.     PHYSICIAN ADVISORY SERVICES:  Medical Necessity Denial & Ppcd-jn-Bmkm Review  Phone: [phone]  Fax: [phone]  Email: [email or web address]     DISCHARGE SUPPORT TEAM:  For Patients Discharge Needs & Updates  Phone: [phone] opt. 2 Fax: [phone]  Email: [email or web address]

## 2025-07-17 NOTE — PROGRESS NOTES
Progress Note - Infectious Disease   Name: Mayur Mauro Jr. 35 y.o. male I MRN: [id number]  Unit/Bed#: -01 I Date of Admission: 7/15/2025   Date of Service: 7/17/2025 I Hospital Day: 2      Administrative Statements   VIRTUAL CARE DOCUMENTATION:     1. This service was provided via Telemedicine using [employer] [facility]     2. Parties in the room with patient during teleconsult Patient only    3. Confidentiality My office door was closed     4. Participants No one else was in the room    5. Patient acknowledged consent and understanding of privacy and security of the  Telemedicine consult. I informed the patient that I have reviewed their record in Epic and presented the opportunity for them to ask any questions regarding the visit today.  The patient agreed to participate.    6. I have spent a total time of 50 minutes in caring for this patient on the day of the visit/encounter including Diagnostic results, Prognosis, Risk factor reductions, Impressions, Counseling / Coordination of care, Documenting in the medical record, Reviewing/placing orders in the medical record (including tests, medications, and/or procedures), Obtaining or reviewing history  , and Communicating with other healthcare professionals , not including the time spent for establishing the audio/video connection.       Assessment & Plan  Cellulitis of left leg  In the setting of nonhealing wound over left ankle.  Symptoms have failed to improve with outpatient Keflex .  CT and MRI is without abscess or definite evidence of osteo  LEADs are within normal limits  Patient is at risk for MRSA and gram negatives   He is systemically well, afebrile without leukocytosis     Continue vancomycin, dosing per pharmacy   Follow-up cultures   On discharge pt can be transitioned to po keflex 500 qid and po bactrim 1 ds bid x 7 days   Serial extremity exams, continue wound care and outpatient follow up   Check daily CBC, CMP while inpatient to monitor for any  evolving antibiotic toxicity or treatment failure   Continue supportive care, monitor clinical course    Ankle wound, left, sequela  Patient reports a scab-like wound developing proximal to medial malleolus of left ankle. Appears clean without purulence or probe to bone  Of note there is a small linear radiopaque foreign body in anterior lower shin but not contiguous with the wound/soft tissue infection     Antibiotics and imaging as above  Current smoker  Risk factor for infection     Continue to monitor for smoking cessation counseling  Drug abuse (HCC)  History of opiate prescription drug abuse, at risk for withdrawal.  Denies injection drug use.  Positive UDS     Toxicology and case management evaluation      Antibiotics:  vancomycin    Subjective   Patient has no fever, chills, sweats; no nausea, vomiting, diarrhea; no cough, shortness of breath; no pain. No new symptoms.    Objective :  Temp:  [97.7 °F (36.5 °C)-97.9 °F (36.6 °C)] 97.9 °F (36.6 °C)  HR:  [64-96] 64  BP: (103-119)/(60-69) 103/69  Resp:  [16-19] 18  SpO2:  [97 %-99 %] 98 %  O2 Device: None (Room air)    General:  No acute distress  Psychiatric:  Awake and alert  Pulmonary:  Normal respiratory excursion without accessory muscle use  Abdomen:  Soft, nontender  Extremities:  No edema  Skin:  Reviewed media pics- L medial ankle wound remains clean with eschar to base and improving periwound erythema      Lab Results: I have reviewed the following results:  Results from last 7 days   Lab Units 07/17/25 0444 07/16/25 0622 07/15/25  2008   WBC Thousand/uL 6.02 4.60 4.88   HEMOGLOBIN g/dL 13.5 14.1 13.3   PLATELETS Thousands/uL 217 201 225     Results from last 7 days   Lab Units 07/17/25 0444 07/16/25  0622 07/15/25  2008   SODIUM mmol/L 141 141 140   POTASSIUM mmol/L 3.9 3.7 4.0   CHLORIDE mmol/L 107 105 106   CO2 mmol/L 27 27 27   BUN mg/dL 11 12 12   CREATININE mg/dL 0.77 0.84 1.03   EGFR ml/min/1.73sq m 119 115 94   CALCIUM mg/dL 9.2 9.4 9.4    AST U/L  --  11* 11*   ALT U/L  --  11 12   ALK PHOS U/L  --  53 52   ALBUMIN g/dL  --  4.5 4.7     Results from last 7 days   Lab Units 07/15/25  2151 07/15/25  2008 07/15/25  2004   BLOOD CULTURE   --  Received in Microbiology Lab. Culture in Progress. Received in Microbiology Lab. Culture in Progress.   GRAM STAIN RESULT  No Polys or Bacteria seen  --   --    WOUND CULTURE  Culture results to follow.  --   --      Results from last 7 days   Lab Units 07/15/25  2008   PROCALCITONIN ng/ml <0.05

## 2025-07-17 NOTE — PLAN OF CARE
Problem: PAIN - ADULT  Goal: Verbalizes/displays adequate comfort level or baseline comfort level  Description: Interventions:  - Encourage patient to monitor pain and request assistance  - Assess pain using appropriate pain scale  - Administer analgesics as ordered based on type and severity of pain and evaluate response  - Implement non-pharmacological measures as appropriate and evaluate response  - Consider cultural and social influences on pain and pain management  - Notify physician/advanced practitioner if interventions unsuccessful or patient reports new pain  - Educate patient/family on pain management process including their role and importance of  reporting pain   - Provide non-pharmacologic/complimentary pain relief interventions  Outcome: Progressing     Problem: PAIN - ADULT  Goal: Verbalizes/displays adequate comfort level or baseline comfort level  Description: Interventions:  - Encourage patient to monitor pain and request assistance  - Assess pain using appropriate pain scale  - Administer analgesics as ordered based on type and severity of pain and evaluate response  - Implement non-pharmacological measures as appropriate and evaluate response  - Consider cultural and social influences on pain and pain management  - Notify physician/advanced practitioner if interventions unsuccessful or patient reports new pain  - Educate patient/family on pain management process including their role and importance of  reporting pain   - Provide non-pharmacologic/complimentary pain relief interventions  Outcome: Progressing     Problem: INFECTION - ADULT  Goal: Absence or prevention of progression during hospitalization  Description: INTERVENTIONS:  - Assess and monitor for signs and symptoms of infection  - Monitor lab/diagnostic results  - Monitor all insertion sites, i.e. indwelling lines, tubes, and drains  - Monitor endotracheal if appropriate and nasal secretions for changes in amount and color  -  [place] appropriate cooling/warming therapies per order  - Administer medications as ordered  - Instruct and encourage patient and family to use good hand hygiene technique  - Identify and instruct in appropriate isolation precautions for identified infection/condition  Outcome: Progressing  Goal: Absence of fever/infection during neutropenic period  Description: INTERVENTIONS:  - Monitor WBC  - Perform strict hand hygiene  - Limit to healthy visitors only  - No plants, dried, fresh or silk flowers with guerra in patient room  Outcome: Progressing     Problem: SAFETY ADULT  Goal: Patient will remain free of falls  Description: INTERVENTIONS:  - Educate patient/family on patient safety including physical limitations  - Instruct patient to call for assistance with activity   - Consider consulting OT/PT to assist with strengthening/mobility based on AM PAC & JH-HL score  - Consult OT/PT to assist with strengthening/mobility   - Keep Call bell within reach  - Keep bed low and locked with side rails adjusted as appropriate  - Keep care items and personal belongings within reach  - Initiate and maintain comfort rounds  - Make Fall Risk Sign visible to staff  - Offer Toileting every 2 Hours, in advance of need  - Initiate/Maintain alarm  - Obtain necessary fall risk management equipment  - Apply yellow socks and bracelet for high fall risk patients  - Consider moving patient to room near nurses station  Outcome: Progressing     Problem: SAFETY ADULT  Goal: Patient will remain free of falls  Description: INTERVENTIONS:  - Educate patient/family on patient safety including physical limitations  - Instruct patient to call for assistance with activity   - Consider consulting OT/PT to assist with strengthening/mobility based on AM PAC & JH-HLM score  - Consult OT/PT to assist with strengthening/mobility   - Keep Call bell within reach  - Keep bed low and locked with side rails adjusted as appropriate  - Keep care items and  personal belongings within reach  - Initiate and maintain comfort rounds  - Make Fall Risk Sign visible to staff  - Offer Toileting every 2 Hours, in advance of need  - Initiate/Maintain alarm  - Obtain necessary fall risk management equipment  - Apply yellow socks and bracelet for high fall risk patients  - Consider moving patient to room near nurses station  Outcome: Progressing  Goal: Maintain or return to baseline ADL function  Description: INTERVENTIONS:  -  Assess patient's ability to carry out ADLs; assess patient's baseline for ADL function and identify physical deficits which impact ability to perform ADLs (bathing, care of mouth/teeth, toileting, grooming, dressing, etc.)  - Assess/evaluate cause of self-care deficits   - Assess range of motion  - Assess patient's mobility; develop plan if impaired  - Assess patient's need for assistive devices and provide as appropriate  - Encourage maximum independence but intervene and supervise when necessary  - Involve family in performance of ADLs  - Assess for home care needs following discharge   - Consider OT consult to assist with ADL evaluation and planning for discharge  - Provide patient education as appropriate  - Monitor functional capacity and physical performance, use of AM PAC & -HLM   - Monitor gait, balance and fatigue with ambulation    Outcome: Progressing  Goal: Maintains/Returns to pre admission functional level  Description: INTERVENTIONS:  - Perform AM-PAC 6 Click Basic Mobility/ Daily Activity assessment daily.  - Set and communicate daily mobility goal to care team and patient/family/caregiver.   - Collaborate with rehabilitation services on mobility goals if consulted  - Perform Range of Motion - times a day.  - Reposition patient every - hours.  - Dangle patient - times a day  - Stand patient - times a day  - Ambulate patient - times a day  - Out of bed to chair - times a day   - Out of bed for meals - times a day  - Out of bed for  toileting  - Record patient progress and toleration of activity level   Outcome: Progressing     Problem: DISCHARGE PLANNING  Goal: Discharge to home or other facility with appropriate resources  Description: INTERVENTIONS:  - Identify barriers to discharge w/patient and caregiver  - Arrange for needed discharge resources and transportation as appropriate  - Identify discharge learning needs (meds, wound care, etc.)  - Arrange for interpretive services to assist at discharge as needed  - Refer to Case Management Department for coordinating discharge planning if the patient needs post-hospital services based on physician/advanced practitioner order or complex needs related to functional status, cognitive ability, or social support system  Outcome: Progressing     Problem: Knowledge Deficit  Goal: Patient/family/caregiver demonstrates understanding of disease process, treatment plan, medications, and discharge instructions  Description: Complete learning assessment and assess knowledge base.  Interventions:  - Provide teaching at level of understanding  - Provide teaching via preferred learning methods  Outcome: Progressing

## 2025-07-17 NOTE — PLAN OF CARE
Problem: PAIN - ADULT  Goal: Verbalizes/displays adequate comfort level or baseline comfort level  Description: Interventions:  - Encourage patient to monitor pain and request assistance  - Assess pain using appropriate pain scale  - Administer analgesics as ordered based on type and severity of pain and evaluate response  - Implement non-pharmacological measures as appropriate and evaluate response  - Consider cultural and social influences on pain and pain management  - Notify physician/advanced practitioner if interventions unsuccessful or patient reports new pain  - Educate patient/family on pain management process including their role and importance of  reporting pain   - Provide non-pharmacologic/complimentary pain relief interventions  Outcome: Progressing     Problem: INFECTION - ADULT  Goal: Absence or prevention of progression during hospitalization  Description: INTERVENTIONS:  - Assess and monitor for signs and symptoms of infection  - Monitor lab/diagnostic results  - Monitor all insertion sites, i.e. indwelling lines, tubes, and drains  - Monitor endotracheal if appropriate and nasal secretions for changes in amount and color  - [place] appropriate cooling/warming therapies per order  - Administer medications as ordered  - Instruct and encourage patient and family to use good hand hygiene technique  - Identify and instruct in appropriate isolation precautions for identified infection/condition  Outcome: Progressing  Goal: Absence of fever/infection during neutropenic period  Description: INTERVENTIONS:  - Monitor WBC  - Perform strict hand hygiene  - Limit to healthy visitors only  - No plants, dried, fresh or silk flowers with guerra in patient room  Outcome: Progressing     Problem: SAFETY ADULT  Goal: Patient will remain free of falls  Description: INTERVENTIONS:  - Educate patient/family on patient safety including physical limitations  - Instruct patient to call for assistance with activity   -  Consider consulting OT/PT to assist with strengthening/mobility based on AM PAC & JH-HLM score  - Consult OT/PT to assist with strengthening/mobility   - Keep Call bell within reach  - Keep bed low and locked with side rails adjusted as appropriate  - Keep care items and personal belongings within reach  - Initiate and maintain comfort rounds  - Make Fall Risk Sign visible to staff  - Apply yellow socks and bracelet for high fall risk patients  - Consider moving patient to room near nurses station  Outcome: Progressing  Goal: Maintain or return to baseline ADL function  Description: INTERVENTIONS:  -  Assess patient's ability to carry out ADLs; assess patient's baseline for ADL function and identify physical deficits which impact ability to perform ADLs (bathing, care of mouth/teeth, toileting, grooming, dressing, etc.)  - Assess/evaluate cause of self-care deficits   - Assess range of motion  - Assess patient's mobility; develop plan if impaired  - Assess patient's need for assistive devices and provide as appropriate  - Encourage maximum independence but intervene and supervise when necessary  - Involve family in performance of ADLs  - Assess for home care needs following discharge   - Consider OT consult to assist with ADL evaluation and planning for discharge  - Provide patient education as appropriate  - Monitor functional capacity and physical performance, use of AM PAC & JH-HLM   - Monitor gait, balance and fatigue with ambulation    Outcome: Progressing  Goal: Maintains/Returns to pre admission functional level  Description: INTERVENTIONS:  - Perform AM-PAC 6 Click Basic Mobility/ Daily Activity assessment daily.  - Set and communicate daily mobility goal to care team and patient/family/caregiver.   - Collaborate with rehabilitation services on mobility goals if consulted  - Out of bed for toileting  - Record patient progress and toleration of activity level   Outcome: Progressing     Problem: DISCHARGE  PLANNING  Goal: Discharge to home or other facility with appropriate resources  Description: INTERVENTIONS:  - Identify barriers to discharge w/patient and caregiver  - Arrange for needed discharge resources and transportation as appropriate  - Identify discharge learning needs (meds, wound care, etc.)  - Arrange for interpretive services to assist at discharge as needed  - Refer to Case Management Department for coordinating discharge planning if the patient needs post-hospital services based on physician/advanced practitioner order or complex needs related to functional status, cognitive ability, or social support system  Outcome: Progressing     Problem: Knowledge Deficit  Goal: Patient/family/caregiver demonstrates understanding of disease process, treatment plan, medications, and discharge instructions  Description: Complete learning assessment and assess knowledge base.  Interventions:  - Provide teaching at level of understanding  - Provide teaching via preferred learning methods  Outcome: Progressing

## 2025-07-17 NOTE — NURSING NOTE
Call placed to [facility] for inpatient Psych Consult. No provider name given as it was shift change. Waiting for call back.

## 2025-07-17 NOTE — ASSESSMENT & PLAN NOTE
Patient presents to the ED with a nonhealing wound to his left ankle.  Patient reports wound started 2-1/2 months ago as a cut.  Patient reports being on Bactrim for an entire week but continues to have purulent drainage from wound along with erythema.   Left ankle x-ray without any fractures  CT left lower extremity: There is a wound on the medial aspect of the distal lower leg (series 3 images 174) with mild adjacent subcutaneous edema could be cellulitis, without abscess formation. No evidence of deeper infection.   Wound cultures no polyps or bacteria  MRSA pending   blood cultures negative at 24 hours  Fall precautions  Patient was treated with IV vancomycin while hospitalized  Patient was seen by podiatry, recommended for MRI which was negative for osteomyelitis therefore recommended for outpatient follow-up with wound care  Patient seen by ID and recommended discharge home on p.o. Keflex and Bactrim double strength x 7 days

## 2025-07-18 ENCOUNTER — TELEPHONE (OUTPATIENT)
Dept: INFECTIOUS DISEASES | Facility: CLINIC | Age: 35
End: 2025-07-18

## 2025-07-18 DIAGNOSIS — L03.90 CELLULITIS, UNSPECIFIED CELLULITIS SITE: Primary | ICD-10-CM

## 2025-07-18 LAB
BACTERIA WND AEROBE CULT: ABNORMAL
BACTERIA WND AEROBE CULT: ABNORMAL
GRAM STN SPEC: ABNORMAL

## 2025-07-18 RX ORDER — LEVOFLOXACIN 750 MG/1
750 TABLET, FILM COATED ORAL EVERY 24 HOURS
Qty: 7 TABLET | Refills: 0 | Status: SHIPPED | OUTPATIENT
Start: 2025-07-18 | End: 2025-07-25

## 2025-07-18 NOTE — TELEPHONE ENCOUNTER
----- Message from Bria Berry MD sent at 7/18/2025 12:59 PM EDT -----  Regarding: change abx  Pt's wound cx updated to pseudomonas  Recommend discontinuing current abx and start levaquin x 7 days. [facility] if you could let pt know    Thanks

## 2025-07-18 NOTE — UTILIZATION REVIEW
NOTIFICATION OF ADMISSION DISCHARGE   This is a Notification of Discharge from [facility]. Please be advised that this patient has been discharge from our facility. Below you will find the admission and discharge date and time including the patient’s disposition.   UTILIZATION REVIEW CONTACT:  Utilization Review Assistants  Network Utilization Review Department  Phone: [phone] x carefully listen to the prompts. All voicemails are confidential.  Email: [email or web address]     ADMISSION INFORMATION  PRESENTATION DATE: 7/15/2025  6:38 PM  OBERVATION ADMISSION DATE: N/A  INPATIENT ADMISSION DATE: 7/15/25  9:40 PM   DISCHARGE DATE: 7/17/2025  7:53 PM   DISPOSITION:Home/Self Care    Network Utilization Review Department  ATTENTION: Please call with any questions or concerns to [phone] and carefully listen to the prompts so that you are directed to the right person. All voicemails are confidential.   For Discharge needs, contact Care Management DC Support Team at [phone] opt. 2  Send all requests for admission clinical reviews, approved or denied determinations and any other requests to dedicated fax number below belonging to the campus where the patient is receiving treatment. List of dedicated fax numbers for the Facilities:  FACILITY NAME UR FAX NUMBER   ADMISSION DENIALS (Administrative/Medical Necessity) [phone]   DISCHARGE SUPPORT TEAM ([facility]) [phone]   PARENT CHILD HEALTH (Maternity/NICU/Pediatrics) [phone]   [facility] [phone]   [facility] [phone]   [facility] [phone]   [facility] [phone]   [facility] [phone]   [facility] [phone]   [facility] [phone]   [facility] [phone]   [facility]  [facility] [phone]   [facility] [phone]   [facility] [phone]   [facility] [phone]

## 2025-07-18 NOTE — TELEPHONE ENCOUNTER
Called and spoke with patients sister Veronica today.   Informed Veronica of Dr. Berry's attached note regarding antibiotic change.   Veronica verbalizes understanding at this time.

## 2025-07-20 LAB
6-ACETYLMORPHINE IA: NEGATIVE NG/ML
ACCEPTABLE CREAT UR QL: 331 MG/DL
AMPHET UR QL CFM: >3021 NG/MG CREAT
AMPHET UR QL SCN: NORMAL NG/ML
AMPHETAMINES: ABNORMAL
BACTERIA BLD CULT: NORMAL
BACTERIA BLD CULT: NORMAL
BARBITURATES UR QL SCN: NEGATIVE NG/ML
BENZODIAZ UR QL SCN: NEGATIVE NG/ML
BUPRENORPHINE UR QL CFM: NORMAL NG/ML
BUPRENORPHINE: NEGATIVE
CANNABINOIDS SERPLBLD QL SCN: ABNORMAL
CANNABINOIDS UR QL SCN: NORMAL NG/ML
CANNABINOIDS/CREAT UR: 6 NG/MG CREAT
CARISOPRODOL UR QL: NEGATIVE NG/ML
COCAINE+BZE UR QL SCN: NEGATIVE NG/ML
ETHYL GLUCURONIDE UR QL SCN: NEGATIVE NG/ML
FENTANYL & ANALOGUES: ABNORMAL
FENTANYL UR QL CFM: 17 NG/MG CREAT
FENTANYL UR QL SCN: NORMAL NG/ML
GABAPENTIN SERPLBLD QL SCN: NEGATIVE UG/ML
MDMA UR QL CFM: NOT DETECTED NG/MG CREAT
MDMA UR QL CFM: NOT DETECTED NG/MG CREAT
METHADONE UR QL SCN: NEGATIVE NG/ML
METHAMPHET UR QL CFM: >1511 NG/MG CREAT
NALOXONE UR CFM-MCNC: NOT DETECTED NG/MG CREAT
NITRITE UR QL STRIP: NEGATIVE UG/ML
NORBUP/BUP RATIO: NORMAL
NORBUPRENORPHINE UR CFM-MCNC: NOT DETECTED NG/MG CREAT
NORBUPRENORPHINE/CREAT UR: NOT DETECTED NG/MG CREAT
NORFENTANYL UR QL CFM: >302 NG/MG CREAT
OPIATE ANTAGONIST: NEGATIVE
OPIATES UR QL SCN: NEGATIVE NG/ML
OXYCODONE+OXYMORPHONE UR QL SCN: NEGATIVE NG/ML
PCP UR QL SCN: NEGATIVE NG/ML
PROPOXYPH UR QL SCN: NEGATIVE NG/ML
SPECIMEN PH ACCEPTABLE UR: 6 (ref 4.5–8.9)
TAPENTADOL UR QL SCN: NEGATIVE NG/ML
TRAMADOL UR QL SCN: NEGATIVE NG/ML

## 2025-07-21 LAB
BACTERIA BLD CULT: NORMAL
BACTERIA BLD CULT: NORMAL

## 2025-07-25 ENCOUNTER — OFFICE VISIT (OUTPATIENT)
Facility: CLINIC | Age: 35
End: 2025-07-25
Payer: COMMERCIAL

## 2025-07-25 VITALS
TEMPERATURE: 97.8 F | DIASTOLIC BLOOD PRESSURE: 64 MMHG | RESPIRATION RATE: 18 BRPM | WEIGHT: 152 LBS | HEART RATE: 81 BPM | SYSTOLIC BLOOD PRESSURE: 124 MMHG | HEIGHT: 68 IN | BODY MASS INDEX: 23.04 KG/M2

## 2025-07-25 DIAGNOSIS — Z72.0 TOBACCO USE: ICD-10-CM

## 2025-07-25 DIAGNOSIS — S81.802A OPEN WOUND OF LEFT LOWER LEG, INITIAL ENCOUNTER: Primary | ICD-10-CM

## 2025-07-25 DIAGNOSIS — F19.10 DRUG ABUSE (HCC): ICD-10-CM

## 2025-07-25 PROCEDURE — 99213 OFFICE O/P EST LOW 20 MIN: CPT | Performed by: STUDENT IN AN ORGANIZED HEALTH CARE EDUCATION/TRAINING PROGRAM

## 2025-07-25 PROCEDURE — 11104 PUNCH BX SKIN SINGLE LESION: CPT | Performed by: STUDENT IN AN ORGANIZED HEALTH CARE EDUCATION/TRAINING PROGRAM

## 2025-07-25 PROCEDURE — 97597 DBRDMT OPN WND 1ST 20 CM/<: CPT | Performed by: STUDENT IN AN ORGANIZED HEALTH CARE EDUCATION/TRAINING PROGRAM

## 2025-07-25 RX ORDER — HYDROCORTISONE 25 MG/G
CREAM TOPICAL DAILY
Qty: 28 G | Refills: 0 | Status: SHIPPED | OUTPATIENT
Start: 2025-07-25

## 2025-07-25 RX ORDER — LIDOCAINE 40 MG/G
CREAM TOPICAL ONCE
Status: COMPLETED | OUTPATIENT
Start: 2025-07-25 | End: 2025-07-25

## 2025-07-25 RX ORDER — LIDOCAINE HYDROCHLORIDE AND EPINEPHRINE 10; 10 MG/ML; UG/ML
1 INJECTION, SOLUTION INFILTRATION; PERINEURAL ONCE
Status: COMPLETED | OUTPATIENT
Start: 2025-07-25 | End: 2025-07-25

## 2025-07-25 RX ADMIN — LIDOCAINE: 40 CREAM TOPICAL at 14:19

## 2025-07-25 RX ADMIN — LIDOCAINE HYDROCHLORIDE AND EPINEPHRINE 1 ML: 10; 10 INJECTION, SOLUTION INFILTRATION; PERINEURAL at 15:07

## 2025-07-25 NOTE — PROGRESS NOTES
"Name: Mayur Mauro Jr.      : 1990      MRN: [id number]  Encounter Provider: Alda Taveras DPM  Encounter Date: 2025   Encounter department: [facility] WOUND CARE  :  Assessment & Plan  Open wound of left lower leg, initial encounter    Orders:    lidocaine (LMX) 4 % cream    Wound cleansing and dressings Left;Lower;Medial Leg; Future    Tissue Exam; Future    Debridement Left;Lower;Medial Leg    Biopsy    hydrocortisone 2.5 % cream; Apply topically in the morning Periwound with dressing change    lidocaine-epinephrine (XYLOCAINE/EPINEPHRINE) 1 %-1:100,000 injection 1 mL    Wound Procedure Treatment Left;Lower;Medial Leg    Drug abuse (HCC)  Tobacco use         Assessment/Plan:  See wound care orders (steroid cream ankit, santyl dsd)  - Left lower leg with open wound of unknown origin appears with mixed necrotic, granular base. Biopsy taken as below. Surgical dbt as well.    - Hospital notes reviewed. ID discharged on keflex & Bactrim x7d 25  - MRI left ankle 25: no evidence of OM of abscess.   - ABIs 25: RLE 1.26/128/79. LLE 1.19/135/105. WNL for healing.  - Recommend smoking and substance cessation  - f/u 1wk      History of Present Illness   No chief complaint on file.  Here for wound follow up.  Mayur presents to clinic today concerning LLE wound.   Per patient wound started as a cut about 2-3 months ago.  PMH of drug abuse, smoking.       Objective   /64   Pulse 81   Temp 97.8 °F (36.6 °C)   Resp 18   Ht 5' 8\" (1.727 m)   Wt 68.9 kg (152 lb)   BMI 23.11 kg/m²     Physical Exam  Constitutional:       Appearance: Normal appearance.     Cardiovascular:      Pulses: Normal pulses.   Pulmonary:      Effort: Pulmonary effort is normal.     Musculoskeletal:         General: Tenderness (slight periwound) present.     Skin:     Findings: Lesion (LLE with medial lower leg/ankle wound, necrotic 80% granular 20% base, no deep structures exposed. slight periwound " "erythema with pruritis however no acute SOI noted.) present. No erythema.     Neurological:      General: No focal deficit present.      Mental Status: He is alert.       Wound 07/25/25 Leg Left;Lower;Medial (Active)   Wound Image   07/25/25 1422   Wound Description Black;Eschar 07/25/25 1424   Non-staged Wound Description Full thickness 07/25/25 1424   Wound Length (cm) 2.1 cm 07/25/25 1424   Wound Width (cm) 1.8 cm 07/25/25 1424   Wound Depth (cm) 0.6 cm 07/25/25 1424   Wound Surface Area (cm^2) 2.97 cm^2 07/25/25 1424   Wound Volume (cm^3) 1.188 cm^3 07/25/25 1424   Calculated Wound Volume (cm^3) 2.27 cm^3 07/25/25 1424   Drainage Amount None 07/25/25 1424   Stephanie-wound Assessment Dry;Pink 07/25/25 1424       Debridement   Wound 07/25/25 Leg Left;Lower;Medial     Date/Time: 7/25/2025 2:00 PM    Universal Protocol:  procedure performed by consultantConsent: Verbal consent obtained  Risks and benefits: risks, benefits and alternatives were discussed  Consent given by: patient  Time out: Immediately prior to procedure a \"time out\" was called to verify the correct patient, procedure, equipment, support staff and site/side marked as required.  Patient understanding: patient states understanding of the procedure being performed  Patient consent: the patient's understanding of the procedure matches consent given  Patient identity confirmed: verbally with patient    Debridement Details  Performed by: physician  Debridement type: selective    Post-debridement measurements  Length (cm): 2.1  Width (cm): 1.8  Depth (cm): 0.6  Percent debrided: 100%  Surface Area (cm^2): 2.97  Area Debrided (cm^2): 2.97  Volume (cm^3): 1.19    Devitalized tissue debrided: biofilm, exudate and eschar  Instrument(s) utilized: blade and forceps  Technique utilized: nonexcisional  Bleeding: small  Hemostasis obtained with: not applicable  Procedural pain (0-10): 1  Post-procedural pain: 1   Response to treatment: procedure was tolerated " "well    Biopsy    Date/Time: 7/25/2025 2:00 PM    Performed by: Alda Taveras DPM  Authorized by: Alda Taveras DPM    Universal Protocol:  procedure performed by consultantConsent: Verbal consent obtained  Risks and benefits: risks, benefits and alternatives were discussed  Consent given by: patient  Time out: Immediately prior to procedure a \"time out\" was called to verify the correct patient, procedure, equipment, support staff and site/side marked as required.  Patient understanding: patient states understanding of the procedure being performed  Patient consent: the patient's understanding of the procedure matches consent given  Patient identity confirmed: verbally with patient    Procedure Details - Lesion Biopsy:     Body area:  Lower extremity    Lower extremity location:  L lower leg    Biopsy method: punch biopsy      Biopsy tissue type: skin and subcutaneous    Initial size (mm):  3    Final defect size (mm):  3    Malignancy: malignancy unknown       1cc 1% lidocaine + epi injected prior to biopsy which was taken from the proximal-anterior aspect of the wound (skin and wound).      Results from last 6 Months   Lab Units 07/15/25  8571   WOUND CULTURE  1+ Growth of Pseudomonas aeruginosa*  1+ Growth of         "

## 2025-07-25 NOTE — PROGRESS NOTES
Wound Procedure Treatment Left;Lower;Medial Leg    Performed by: Juan Betancourt RN  Authorized by: Alda Taveras DPM  Associated wounds:   Wound 07/25/25 Leg Left;Lower;Medial    Applied topical:  Betadine   Applied secondary dressing:  Gauze   Dressing secured with:  Fina and Tape

## 2025-07-25 NOTE — PATIENT INSTRUCTIONS
Orders Placed This Encounter   Procedures    Wound cleansing and dressings Left;Lower;Medial Leg     Wash your hands with soap and water.  Remove old dressing, discard into plastic bag and place in trash.  Cleanse the wound with mld, unscented soap (Dove)  prior to applying a clean dressing. Do not use tissue or cotton balls. Do not scrub the wound. Pat dry using gauze.    Shower yes     Apply steroid ointment to the ankit wound  Apply santyl (greta thick) to the L leg wound.    Cover with adaptic and then gauze  Secure with stefan and tape  Change dressing daily    Watch for signs of infection (redness,warmth to the touch, increased pain, odor, pus, swelling, fever, chills, nausea, vomiting). If you notice any of these call the wound center or proceed to the ER.     Standing Status:   Future     Expiration Date:   8/1/2025    Debridement Left;Lower;Medial Leg     This order was created via procedure documentation    Biopsy     This order was created via procedure documentation

## 2025-08-01 ENCOUNTER — OFFICE VISIT (OUTPATIENT)
Facility: CLINIC | Age: 35
End: 2025-08-01
Payer: COMMERCIAL

## 2025-08-01 VITALS
TEMPERATURE: 97.3 F | RESPIRATION RATE: 16 BRPM | SYSTOLIC BLOOD PRESSURE: 114 MMHG | HEART RATE: 63 BPM | DIASTOLIC BLOOD PRESSURE: 70 MMHG

## 2025-08-01 DIAGNOSIS — Z72.0 TOBACCO USE: ICD-10-CM

## 2025-08-01 DIAGNOSIS — F19.10 DRUG ABUSE (HCC): ICD-10-CM

## 2025-08-01 DIAGNOSIS — S81.802A OPEN WOUND OF LEFT LOWER LEG, INITIAL ENCOUNTER: Primary | ICD-10-CM

## 2025-08-01 PROCEDURE — 99212 OFFICE O/P EST SF 10 MIN: CPT | Performed by: STUDENT IN AN ORGANIZED HEALTH CARE EDUCATION/TRAINING PROGRAM

## 2025-08-01 PROCEDURE — 99214 OFFICE O/P EST MOD 30 MIN: CPT | Performed by: STUDENT IN AN ORGANIZED HEALTH CARE EDUCATION/TRAINING PROGRAM

## 2025-08-01 RX ORDER — LIDOCAINE 40 MG/G
CREAM TOPICAL ONCE
Status: COMPLETED | OUTPATIENT
Start: 2025-08-01 | End: 2025-08-01

## 2025-08-01 RX ORDER — SODIUM HYPOCHLORITE 2.5 MG/ML
1 SOLUTION TOPICAL DAILY
Qty: 473 ML | Refills: 0 | Status: SHIPPED | OUTPATIENT
Start: 2025-08-01

## 2025-08-01 RX ADMIN — LIDOCAINE 1 APPLICATION: 40 CREAM TOPICAL at 15:23

## 2025-08-08 ENCOUNTER — OFFICE VISIT (OUTPATIENT)
Facility: CLINIC | Age: 35
End: 2025-08-08
Payer: COMMERCIAL

## 2025-08-08 VITALS
DIASTOLIC BLOOD PRESSURE: 66 MMHG | TEMPERATURE: 97.2 F | RESPIRATION RATE: 18 BRPM | HEART RATE: 79 BPM | SYSTOLIC BLOOD PRESSURE: 115 MMHG

## 2025-08-08 DIAGNOSIS — Z72.0 TOBACCO USE: ICD-10-CM

## 2025-08-08 DIAGNOSIS — F19.10 DRUG ABUSE (HCC): ICD-10-CM

## 2025-08-08 DIAGNOSIS — S81.802A OPEN WOUND OF LEFT LOWER LEG, INITIAL ENCOUNTER: Primary | ICD-10-CM

## 2025-08-08 PROCEDURE — 97597 DBRDMT OPN WND 1ST 20 CM/<: CPT | Performed by: STUDENT IN AN ORGANIZED HEALTH CARE EDUCATION/TRAINING PROGRAM

## 2025-08-08 RX ORDER — LIDOCAINE 40 MG/G
CREAM TOPICAL ONCE
Status: COMPLETED | OUTPATIENT
Start: 2025-08-08 | End: 2025-08-08

## 2025-08-08 RX ADMIN — LIDOCAINE: 40 CREAM TOPICAL at 14:31

## 2025-08-22 ENCOUNTER — OFFICE VISIT (OUTPATIENT)
Facility: CLINIC | Age: 35
End: 2025-08-22
Payer: COMMERCIAL

## 2025-08-22 VITALS
SYSTOLIC BLOOD PRESSURE: 125 MMHG | TEMPERATURE: 97.1 F | RESPIRATION RATE: 18 BRPM | HEART RATE: 80 BPM | DIASTOLIC BLOOD PRESSURE: 58 MMHG

## 2025-08-22 DIAGNOSIS — Z72.0 TOBACCO USE: ICD-10-CM

## 2025-08-22 DIAGNOSIS — S81.802A OPEN WOUND OF LEFT LOWER LEG, INITIAL ENCOUNTER: Primary | ICD-10-CM

## 2025-08-22 PROCEDURE — 99212 OFFICE O/P EST SF 10 MIN: CPT | Performed by: FAMILY MEDICINE

## 2025-08-22 RX ORDER — SODIUM HYPOCHLORITE 2.5 MG/ML
SOLUTION TOPICAL
Qty: 473 ML | Refills: 0 | Status: SHIPPED | OUTPATIENT
Start: 2025-08-22

## 2025-08-22 RX ORDER — LIDOCAINE 40 MG/G
CREAM TOPICAL ONCE
Status: COMPLETED | OUTPATIENT
Start: 2025-08-22 | End: 2025-08-22

## 2025-08-22 RX ADMIN — LIDOCAINE 1 APPLICATION: 40 CREAM TOPICAL at 13:50
